# Patient Record
Sex: FEMALE | Race: WHITE | NOT HISPANIC OR LATINO | Employment: OTHER | URBAN - METROPOLITAN AREA
[De-identification: names, ages, dates, MRNs, and addresses within clinical notes are randomized per-mention and may not be internally consistent; named-entity substitution may affect disease eponyms.]

---

## 2017-01-04 ENCOUNTER — OFFICE VISIT - RIVER FALLS (OUTPATIENT)
Dept: FAMILY MEDICINE | Facility: CLINIC | Age: 67
End: 2017-01-04

## 2017-01-04 ASSESSMENT — MIFFLIN-ST. JEOR: SCORE: 1547.59

## 2017-07-07 ENCOUNTER — OFFICE VISIT - RIVER FALLS (OUTPATIENT)
Dept: FAMILY MEDICINE | Facility: CLINIC | Age: 67
End: 2017-07-07

## 2017-07-07 ASSESSMENT — MIFFLIN-ST. JEOR: SCORE: 1541.24

## 2017-07-08 LAB
CHOLEST SERPL-MCNC: 184 MG/DL (ref 125–200)
CHOLEST/HDLC SERPL: 3.5 {RATIO}
CREAT SERPL-MCNC: 1.02 MG/DL (ref 0.5–0.99)
GLUCOSE BLD-MCNC: 101 MG/DL (ref 65–99)
HDLC SERPL-MCNC: 53 MG/DL
LDLC SERPL CALC-MCNC: 90 MG/DL
NONHDLC SERPL-MCNC: 131 MG/DL
TRIGL SERPL-MCNC: 207 MG/DL

## 2017-10-18 ENCOUNTER — AMBULATORY - RIVER FALLS (OUTPATIENT)
Dept: FAMILY MEDICINE | Facility: CLINIC | Age: 67
End: 2017-10-18

## 2018-01-03 ENCOUNTER — OFFICE VISIT - RIVER FALLS (OUTPATIENT)
Dept: FAMILY MEDICINE | Facility: CLINIC | Age: 68
End: 2018-01-03

## 2018-07-11 ENCOUNTER — OFFICE VISIT - RIVER FALLS (OUTPATIENT)
Dept: FAMILY MEDICINE | Facility: CLINIC | Age: 68
End: 2018-07-11

## 2018-07-11 ASSESSMENT — MIFFLIN-ST. JEOR: SCORE: 1553.72

## 2018-07-12 LAB
CHOLEST SERPL-MCNC: 185 MG/DL
CHOLEST/HDLC SERPL: 3.8 {RATIO}
CREAT SERPL-MCNC: 1.09 MG/DL (ref 0.5–0.99)
GLUCOSE BLD-MCNC: 103 MG/DL (ref 65–99)
HDLC SERPL-MCNC: 49 MG/DL
LDLC SERPL CALC-MCNC: 103 MG/DL
NONHDLC SERPL-MCNC: 136 MG/DL
TRIGL SERPL-MCNC: 211 MG/DL

## 2018-08-14 ENCOUNTER — OFFICE VISIT - RIVER FALLS (OUTPATIENT)
Dept: FAMILY MEDICINE | Facility: CLINIC | Age: 68
End: 2018-08-14

## 2018-10-02 ENCOUNTER — AMBULATORY - RIVER FALLS (OUTPATIENT)
Dept: FAMILY MEDICINE | Facility: CLINIC | Age: 68
End: 2018-10-02

## 2019-01-16 ENCOUNTER — OFFICE VISIT - RIVER FALLS (OUTPATIENT)
Dept: FAMILY MEDICINE | Facility: CLINIC | Age: 69
End: 2019-01-16

## 2019-07-12 ENCOUNTER — OFFICE VISIT - RIVER FALLS (OUTPATIENT)
Dept: FAMILY MEDICINE | Facility: CLINIC | Age: 69
End: 2019-07-12

## 2019-07-12 ASSESSMENT — MIFFLIN-ST. JEOR: SCORE: 1511.08

## 2019-07-13 ENCOUNTER — COMMUNICATION - RIVER FALLS (OUTPATIENT)
Dept: FAMILY MEDICINE | Facility: CLINIC | Age: 69
End: 2019-07-13

## 2019-07-13 LAB
BUN SERPL-MCNC: 21 MG/DL (ref 7–25)
BUN/CREAT RATIO - HISTORICAL: 20 (ref 6–22)
CALCIUM SERPL-MCNC: 10 MG/DL (ref 8.6–10.4)
CHLORIDE BLD-SCNC: 101 MMOL/L (ref 98–110)
CHOLEST SERPL-MCNC: 184 MG/DL
CHOLEST/HDLC SERPL: 3.8 {RATIO}
CO2 SERPL-SCNC: 32 MMOL/L (ref 20–32)
CREAT SERPL-MCNC: 1.03 MG/DL (ref 0.5–0.99)
EGFRCR SERPLBLD CKD-EPI 2021: 56 ML/MIN/1.73M2
GLUCOSE BLD-MCNC: 100 MG/DL (ref 65–99)
HDLC SERPL-MCNC: 49 MG/DL
LDLC SERPL CALC-MCNC: 102 MG/DL
NONHDLC SERPL-MCNC: 135 MG/DL
POTASSIUM BLD-SCNC: 4.6 MMOL/L (ref 3.5–5.3)
SODIUM SERPL-SCNC: 140 MMOL/L (ref 135–146)
TRIGL SERPL-MCNC: 212 MG/DL

## 2019-08-05 LAB — COLOGUARD-ABSTRACT: NEGATIVE

## 2019-10-04 ENCOUNTER — OFFICE VISIT - RIVER FALLS (OUTPATIENT)
Dept: FAMILY MEDICINE | Facility: CLINIC | Age: 69
End: 2019-10-04

## 2019-10-04 ASSESSMENT — MIFFLIN-ST. JEOR: SCORE: 1518.34

## 2019-10-11 ENCOUNTER — AMBULATORY - RIVER FALLS (OUTPATIENT)
Dept: FAMILY MEDICINE | Facility: CLINIC | Age: 69
End: 2019-10-11

## 2020-01-15 ENCOUNTER — OFFICE VISIT - RIVER FALLS (OUTPATIENT)
Dept: FAMILY MEDICINE | Facility: CLINIC | Age: 70
End: 2020-01-15

## 2020-01-15 ASSESSMENT — MIFFLIN-ST. JEOR: SCORE: 1519.24

## 2020-07-10 ENCOUNTER — OFFICE VISIT - RIVER FALLS (OUTPATIENT)
Dept: FAMILY MEDICINE | Facility: CLINIC | Age: 70
End: 2020-07-10

## 2020-07-10 ASSESSMENT — MIFFLIN-ST. JEOR: SCORE: 1490.21

## 2020-07-23 ENCOUNTER — OFFICE VISIT - RIVER FALLS (OUTPATIENT)
Dept: FAMILY MEDICINE | Facility: CLINIC | Age: 70
End: 2020-07-23

## 2020-07-23 ASSESSMENT — MIFFLIN-ST. JEOR: SCORE: 1503.37

## 2020-07-24 ENCOUNTER — COMMUNICATION - RIVER FALLS (OUTPATIENT)
Dept: FAMILY MEDICINE | Facility: CLINIC | Age: 70
End: 2020-07-24

## 2020-07-24 LAB
BUN SERPL-MCNC: 34 MG/DL (ref 7–25)
BUN/CREAT RATIO - HISTORICAL: 28 (ref 6–22)
CALCIUM SERPL-MCNC: 9.9 MG/DL (ref 8.6–10.4)
CHLORIDE BLD-SCNC: 98 MMOL/L (ref 98–110)
CHOLEST SERPL-MCNC: 191 MG/DL
CHOLEST/HDLC SERPL: 3.4 {RATIO}
CO2 SERPL-SCNC: 31 MMOL/L (ref 20–32)
CREAT SERPL-MCNC: 1.23 MG/DL (ref 0.5–0.99)
EGFRCR SERPLBLD CKD-EPI 2021: 45 ML/MIN/1.73M2
GLUCOSE BLD-MCNC: 118 MG/DL (ref 65–99)
HDLC SERPL-MCNC: 56 MG/DL
LDLC SERPL CALC-MCNC: 105 MG/DL
NONHDLC SERPL-MCNC: 135 MG/DL
POTASSIUM BLD-SCNC: 4.4 MMOL/L (ref 3.5–5.3)
SODIUM SERPL-SCNC: 137 MMOL/L (ref 135–146)
TRIGL SERPL-MCNC: 187 MG/DL

## 2020-09-02 ENCOUNTER — COMMUNICATION - RIVER FALLS (OUTPATIENT)
Dept: FAMILY MEDICINE | Facility: CLINIC | Age: 70
End: 2020-09-02

## 2020-09-02 ENCOUNTER — OFFICE VISIT - RIVER FALLS (OUTPATIENT)
Dept: FAMILY MEDICINE | Facility: CLINIC | Age: 70
End: 2020-09-02

## 2020-09-16 ENCOUNTER — AMBULATORY - RIVER FALLS (OUTPATIENT)
Dept: FAMILY MEDICINE | Facility: CLINIC | Age: 70
End: 2020-09-16

## 2020-09-17 LAB
BUN SERPL-MCNC: 27 MG/DL (ref 7–25)
BUN/CREAT RATIO - HISTORICAL: 23 (ref 6–22)
CALCIUM SERPL-MCNC: 9.9 MG/DL (ref 8.6–10.4)
CHLORIDE BLD-SCNC: 94 MMOL/L (ref 98–110)
CO2 SERPL-SCNC: 31 MMOL/L (ref 20–32)
CREAT SERPL-MCNC: 1.2 MG/DL (ref 0.5–0.99)
EGFRCR SERPLBLD CKD-EPI 2021: 46 ML/MIN/1.73M2
GLUCOSE BLD-MCNC: 100 MG/DL (ref 65–99)
POTASSIUM BLD-SCNC: 4.6 MMOL/L (ref 3.5–5.3)
SODIUM SERPL-SCNC: 133 MMOL/L (ref 135–146)
URATE SERPL-MCNC: 8.5 MG/DL (ref 2.5–7)

## 2020-09-23 ENCOUNTER — OFFICE VISIT - RIVER FALLS (OUTPATIENT)
Dept: FAMILY MEDICINE | Facility: CLINIC | Age: 70
End: 2020-09-23

## 2020-09-23 ASSESSMENT — MIFFLIN-ST. JEOR: SCORE: 1531.49

## 2020-10-14 ENCOUNTER — OFFICE VISIT - RIVER FALLS (OUTPATIENT)
Dept: FAMILY MEDICINE | Facility: CLINIC | Age: 70
End: 2020-10-14

## 2020-10-14 ASSESSMENT — MIFFLIN-ST. JEOR: SCORE: 1530.13

## 2020-10-15 LAB
ALBUMIN SERPL-MCNC: 4.4 GM/DL (ref 3.6–5.1)
ALBUMIN UR-MCNC: NEGATIVE G/DL
APPEARANCE UR: CLEAR
BACTERIA #/AREA URNS HPF: ABNORMAL /HPF
BILIRUB UR QL STRIP: NEGATIVE
CAOX CRY #/AREA URNS HPF: ABNORMAL /HPF
COLOR UR AUTO: YELLOW
CREAT UR-MCNC: 155 MG/DL (ref 20–275)
GLUCOSE UR STRIP-MCNC: NEGATIVE MG/DL
HGB BLD-MCNC: 14.8 GM/DL (ref 11.7–15.5)
HGB UR QL STRIP: NEGATIVE
HYALINE CASTS #/AREA URNS LPF: ABNORMAL /LPF
KETONES UR STRIP-MCNC: NEGATIVE MG/DL
LEUKOCYTE ESTERASE UR QL STRIP: NEGATIVE
MICROALBUMIN UR-MCNC: 1.4 MG/DL
MICROALBUMIN/CREAT UR: 9 MG/G{CREAT}
NITRATE UR QL: NEGATIVE
PH UR STRIP: 6.5 [PH] (ref 5–8)
PTH-INTACT SERPL-MCNC: 60 PG/ML (ref 14–64)
RBC #/AREA URNS AUTO: ABNORMAL /HPF
SP GR UR STRIP: 1.02 (ref 1–1.03)
SQUAMOUS #/AREA URNS AUTO: ABNORMAL /HPF
WBC #/AREA URNS AUTO: ABNORMAL /HPF

## 2020-10-19 ENCOUNTER — COMMUNICATION - RIVER FALLS (OUTPATIENT)
Dept: FAMILY MEDICINE | Facility: CLINIC | Age: 70
End: 2020-10-19

## 2020-11-04 ENCOUNTER — OFFICE VISIT - RIVER FALLS (OUTPATIENT)
Dept: FAMILY MEDICINE | Facility: CLINIC | Age: 70
End: 2020-11-04

## 2020-11-04 ASSESSMENT — MIFFLIN-ST. JEOR: SCORE: 1530.13

## 2021-01-13 ENCOUNTER — OFFICE VISIT - RIVER FALLS (OUTPATIENT)
Dept: FAMILY MEDICINE | Facility: CLINIC | Age: 71
End: 2021-01-13

## 2021-01-13 ASSESSMENT — MIFFLIN-ST. JEOR: SCORE: 1529.22

## 2021-01-14 ENCOUNTER — COMMUNICATION - RIVER FALLS (OUTPATIENT)
Dept: FAMILY MEDICINE | Facility: CLINIC | Age: 71
End: 2021-01-14

## 2021-01-14 LAB
BUN SERPL-MCNC: 22 MG/DL (ref 7–25)
BUN/CREAT RATIO - HISTORICAL: ABNORMAL (ref 6–22)
CALCIUM SERPL-MCNC: 9.9 MG/DL (ref 8.6–10.4)
CHLORIDE BLD-SCNC: 102 MMOL/L (ref 98–110)
CO2 SERPL-SCNC: 29 MMOL/L (ref 20–32)
CREAT SERPL-MCNC: 0.86 MG/DL (ref 0.5–0.99)
EGFRCR SERPLBLD CKD-EPI 2021: 69 ML/MIN/1.73M2
GLUCOSE BLD-MCNC: 104 MG/DL (ref 65–99)
POTASSIUM BLD-SCNC: 4.3 MMOL/L (ref 3.5–5.3)
SODIUM SERPL-SCNC: 139 MMOL/L (ref 135–146)

## 2021-07-28 ENCOUNTER — TRANSFERRED RECORDS (OUTPATIENT)
Dept: MULTI SPECIALTY CLINIC | Facility: CLINIC | Age: 71
End: 2021-07-28

## 2021-07-28 ENCOUNTER — OFFICE VISIT - RIVER FALLS (OUTPATIENT)
Dept: FAMILY MEDICINE | Facility: CLINIC | Age: 71
End: 2021-07-28

## 2021-07-28 ENCOUNTER — COMMUNICATION - RIVER FALLS (OUTPATIENT)
Dept: FAMILY MEDICINE | Facility: CLINIC | Age: 71
End: 2021-07-28

## 2021-07-28 ASSESSMENT — MIFFLIN-ST. JEOR: SCORE: 1534.67

## 2021-07-29 ENCOUNTER — COMMUNICATION - RIVER FALLS (OUTPATIENT)
Dept: FAMILY MEDICINE | Facility: CLINIC | Age: 71
End: 2021-07-29

## 2021-07-29 LAB
CHOLEST SERPL-MCNC: 173 MG/DL
CHOLEST/HDLC SERPL: 3.5 {RATIO}
HDLC SERPL-MCNC: 50 MG/DL
LDLC SERPL CALC-MCNC: 92 MG/DL
NONHDLC SERPL-MCNC: 123 MG/DL
TRIGL SERPL-MCNC: 212 MG/DL

## 2021-09-21 ENCOUNTER — AMBULATORY - RIVER FALLS (OUTPATIENT)
Dept: FAMILY MEDICINE | Facility: CLINIC | Age: 71
End: 2021-09-21

## 2021-12-02 ENCOUNTER — AMBULATORY - RIVER FALLS (OUTPATIENT)
Dept: FAMILY MEDICINE | Facility: CLINIC | Age: 71
End: 2021-12-02

## 2022-01-14 ENCOUNTER — OFFICE VISIT - RIVER FALLS (OUTPATIENT)
Dept: FAMILY MEDICINE | Facility: CLINIC | Age: 72
End: 2022-01-14

## 2022-02-11 VITALS
DIASTOLIC BLOOD PRESSURE: 82 MMHG | HEIGHT: 64 IN | WEIGHT: 223.6 LBS | SYSTOLIC BLOOD PRESSURE: 134 MMHG | HEART RATE: 93 BPM | BODY MASS INDEX: 38.17 KG/M2 | TEMPERATURE: 99.2 F

## 2022-02-11 VITALS
DIASTOLIC BLOOD PRESSURE: 82 MMHG | SYSTOLIC BLOOD PRESSURE: 119 MMHG | SYSTOLIC BLOOD PRESSURE: 170 MMHG | TEMPERATURE: 98.7 F | HEIGHT: 64 IN | DIASTOLIC BLOOD PRESSURE: 76 MMHG | SYSTOLIC BLOOD PRESSURE: 144 MMHG | HEIGHT: 64 IN | HEIGHT: 64 IN | HEART RATE: 74 BPM | TEMPERATURE: 97.9 F | BODY MASS INDEX: 38.58 KG/M2 | HEART RATE: 72 BPM | TEMPERATURE: 98.3 F | OXYGEN SATURATION: 97 % | WEIGHT: 226 LBS | HEART RATE: 84 BPM | WEIGHT: 226.3 LBS | DIASTOLIC BLOOD PRESSURE: 80 MMHG | DIASTOLIC BLOOD PRESSURE: 86 MMHG | SYSTOLIC BLOOD PRESSURE: 150 MMHG | HEART RATE: 88 BPM | BODY MASS INDEX: 38.64 KG/M2 | TEMPERATURE: 98.7 F | BODY MASS INDEX: 37.78 KG/M2 | WEIGHT: 226 LBS | BODY MASS INDEX: 38.58 KG/M2 | HEIGHT: 64 IN

## 2022-02-11 VITALS
WEIGHT: 220.1 LBS | BODY MASS INDEX: 37.08 KG/M2 | WEIGHT: 217.2 LBS | TEMPERATURE: 97.8 F | SYSTOLIC BLOOD PRESSURE: 135 MMHG | BODY MASS INDEX: 37.58 KG/M2 | SYSTOLIC BLOOD PRESSURE: 159 MMHG | HEIGHT: 64 IN | HEART RATE: 84 BPM | TEMPERATURE: 100 F | HEART RATE: 121 BPM | DIASTOLIC BLOOD PRESSURE: 89 MMHG | HEIGHT: 64 IN | DIASTOLIC BLOOD PRESSURE: 81 MMHG

## 2022-02-11 VITALS
HEART RATE: 91 BPM | SYSTOLIC BLOOD PRESSURE: 122 MMHG | TEMPERATURE: 98.4 F | DIASTOLIC BLOOD PRESSURE: 86 MMHG | WEIGHT: 232.4 LBS

## 2022-02-11 VITALS
OXYGEN SATURATION: 97 % | TEMPERATURE: 98.7 F | BODY MASS INDEX: 38.14 KG/M2 | WEIGHT: 225.8 LBS | HEART RATE: 102 BPM | TEMPERATURE: 98.7 F | SYSTOLIC BLOOD PRESSURE: 145 MMHG | DIASTOLIC BLOOD PRESSURE: 97 MMHG | WEIGHT: 223.4 LBS | DIASTOLIC BLOOD PRESSURE: 71 MMHG | HEIGHT: 64 IN | HEART RATE: 80 BPM | HEIGHT: 64 IN | BODY MASS INDEX: 38.55 KG/M2 | SYSTOLIC BLOOD PRESSURE: 117 MMHG

## 2022-02-11 VITALS
BODY MASS INDEX: 39.54 KG/M2 | HEART RATE: 97 BPM | HEIGHT: 64 IN | SYSTOLIC BLOOD PRESSURE: 132 MMHG | DIASTOLIC BLOOD PRESSURE: 84 MMHG | WEIGHT: 231.6 LBS | TEMPERATURE: 98.2 F

## 2022-02-11 VITALS
SYSTOLIC BLOOD PRESSURE: 132 MMHG | HEIGHT: 64 IN | DIASTOLIC BLOOD PRESSURE: 84 MMHG | WEIGHT: 227 LBS | HEART RATE: 60 BPM | BODY MASS INDEX: 38.76 KG/M2

## 2022-02-11 VITALS
TEMPERATURE: 97.6 F | BODY MASS INDEX: 37.87 KG/M2 | WEIGHT: 221.8 LBS | DIASTOLIC BLOOD PRESSURE: 82 MMHG | SYSTOLIC BLOOD PRESSURE: 133 MMHG | HEIGHT: 64 IN | HEART RATE: 66 BPM

## 2022-02-11 VITALS
HEART RATE: 82 BPM | WEIGHT: 224.8 LBS | BODY MASS INDEX: 38.59 KG/M2 | TEMPERATURE: 97.7 F | SYSTOLIC BLOOD PRESSURE: 125 MMHG | DIASTOLIC BLOOD PRESSURE: 81 MMHG

## 2022-02-11 VITALS
DIASTOLIC BLOOD PRESSURE: 83 MMHG | HEART RATE: 73 BPM | SYSTOLIC BLOOD PRESSURE: 125 MMHG | WEIGHT: 231.2 LBS | TEMPERATURE: 98.2 F | BODY MASS INDEX: 39.47 KG/M2 | HEIGHT: 64 IN

## 2022-02-11 VITALS
SYSTOLIC BLOOD PRESSURE: 117 MMHG | WEIGHT: 230.2 LBS | HEIGHT: 64 IN | BODY MASS INDEX: 39.3 KG/M2 | HEART RATE: 76 BPM | DIASTOLIC BLOOD PRESSURE: 80 MMHG | TEMPERATURE: 98.7 F

## 2022-02-15 NOTE — PROGRESS NOTES
Patient:   JULIEN WU            MRN: [id number]            FIN: [id number]               Age:   70 years     Sex:  Female     :  1950   Associated Diagnoses:   Chronic gout; Hypertensive renal failure   Author:   Jorge Tim MD      Visit Information      Date of Service: 2020 08:32 am  Performing Location: [facility]  Encounter#: [id number]      Primary Care Provider (PCP):  Jorge Tim MD    NPI# [id number]      Referring Provider:  Jorge Tim MD# [id number]      Chief Complaint   2020 8:46 AM CDT    f/u labs        History of Present Illness   Patient is here for follow-up on her hypertension and her renal failure.  She notes her blood pressure is running a bit high off the hydrochlorothiazide and she is not urinating as much.  She is had no flares of gout.  No issues with increased edema.         Review of Systems   Constitutional:  Negative.    Eye:  Negative.    Ear/Nose/Mouth/Throat:  Negative.    Respiratory:  Negative.    Cardiovascular:  Negative.    Gastrointestinal:  Negative.    Genitourinary:  Negative.    Hematology/Lymphatics:  Negative.    Endocrine:  Negative.    Immunologic:  Negative.    Musculoskeletal:  Negative.    Integumentary:  Negative.    Neurologic:  Negative.       Health Status   Allergies:    Allergic Reactions (Selected)  Severity Not Documented  Amoxicillin (Rash and hand swelling)   Medications:  (Selected)   Prescriptions  Prescribed  amLODIPine 5 mg oral tablet: = 1 tab(s) ( 5 mg ), Oral, daily, # 90 tab(s), 0 Refill(s), Type: Maintenance, Pharmacy: [email or web address], 1 tab(s) Oral daily, 64, in, 20 9:01:00 CDT, Height Measured, 220.1, lb, 20 8:39:00 CDT, Weight Measured  atorvastatin 20 mg oral tablet: = 1 tab(s) ( 20 mg ), po, daily, # 90 tab(s), 1 Refill(s), Type: Maintenance, Pharmacy: [facility], 1 tab(s) Oral daily, 64, in, 20 8:39:00 CDT, Height Measured, Weight  Measured  lisinopril 10 mg oral tablet: = 1 tab(s) ( 10 mg ), po, daily, # 90 tab(s), 1 Refill(s), Type: Maintenance, Pharmacy: [facility], 1 tab(s) Oral daily, 64, in, 07/23/20 8:39:00 CDT, Height Measured, Weight Measured  metoprolol succinate 25 mg oral tablet, extended release: = 1 tab(s) ( 25 mg ), Oral, daily, # 90 tab(s), 1 Refill(s), Type: Maintenance, Pharmacy: [facility], 1 tab(s) Oral daily, 64, in, 09/23/20 8:46:00 CDT, Height Measured, 226.3, lb, 09/23/20 8:46:00 CDT, Weight Measured  Suspended  hydroCHLOROthiazide 25 mg oral tablet: = 1 tab(s) ( 25 mg ), po, daily, # 90 tab(s), 1 Refill(s), Type: Maintenance, Pharmacy: [facility], 1 tab(s) Oral daily, 64, in, 07/23/20 8:39:00 CDT, Height Measured, Weight Measured  Documented Medications  Documented  Fish Oil: po, 0 Refill(s), Type: Maintenance  Multiple Vitamins oral tablet: 1 tab(s), po, daily, 0 Refill(s), Type: Maintenance  Vitamin C: daily, 0 Refill(s), Type: Maintenance  garlic oral tablet: 0 Refill(s), Type: Maintenance,    Medications          *denotes recorded medication          amLODIPine 5 mg oral tablet: 5 mg, 1 tab(s), Oral, daily, 90 tab(s), 0 Refill(s).          *Vitamin C: daily.          atorvastatin 20 mg oral tablet: 20 mg, 1 tab(s), po, daily, 90 tab(s), 1 Refill(s).          *garlic oral tablet: 0 Refill(s), Type: Maintenance.          lisinopril 10 mg oral tablet: 10 mg, 1 tab(s), po, daily, 90 tab(s), 1 Refill(s).          metoprolol succinate 25 mg oral tablet, extended release: 25 mg, 1 tab(s), Oral, daily, 90 tab(s), 1 Refill(s).          *Multiple Vitamins oral tablet: 1 tab(s), po, daily.          *Fish Oil: po.       Problem list:    All Problems  Hypertension / SNOMED CT 51072947 / Confirmed  Chronic gout / SNOMED CT 693698950 / Confirmed  Elevated cholesterol / SNOMED CT 20715092 / Confirmed  Obesity / SNOMED CT 5857273253 / Probable  Degenerative arthritis of knee / SNOMED CT 096068097 /  Confirmed  Seasonal allergies / SNOMED CT 556193735 / Confirmed      Histories   Past Medical History:    Active  Obesity (7828912967)  Degenerative arthritis of knee (363076914)  Comments:  10/13/2014 CDT 9:12 AM CDT - Shivani Ayala  Bilateral  Elevated cholesterol (50566454)  Hypertension (43867474)  Seasonal allergies (643700160)  Chronic gout (190829102)   Family History:    Liver cancer  Mother  Hypertension  Mother  Heart disease  Father  Parkinson's disease  Mother  Alzheimer's disease  Mother     Procedure history:    Screening for colon cancer (SNOMED CT 2513220005) performed by Jorge Tim MD on 7/25/2019 at 69 Years.  Comments:  8/5/2019 3:29 PM CDT - Estefani Jay MA resutl:  negative  Recommendation:  f/u 3yrs  Mammogram (SNOMED CT 965367758) on 8/3/2016 at 66 Years.  Comments:  8/4/2016 7:51 PM CDT - Leni Cantu CMA  ACR 1 Negative; recommend annual mammograms  Screening for malignant neoplasm of colon (SNOMED CT 9017499783) performed by Jorge Tim MD on 7/20/2016 at 66 Years.  Comments:  8/1/2016 6:54 PM MARKT Estefani Fernandez MA result--negative  F/U:   3yrs   Social History:        Alcohol Assessment: Current            Wine (5 oz), 1-2 times per year, 1 drinks/episode average.  2 drinks/episode maximum.  Ready to change: No.                     Comments:                      07/07/2017 - Shivani Ayala                     Occasionally      Tobacco Assessment: Denies Tobacco Use            Never (less than 100 in lifetime)      Substance Abuse Assessment: Denies Substance Abuse            Never      Employment and Education Assessment            Retired      Home and Environment Assessment            Marital status: .  Spouse/Partner name: Yao Au.  Living situation: Home/Independent.               Injuries/Abuse/Neglect in household: No.  Feels unsafe at home: No.  Family/Friends available for support:               Yes.  Risks in environment:  Stairs.      Nutrition and Health Assessment            Type of diet: Regular.  Wants to lose weight: Yes.  Sleeping concerns: No.  Feels highly stressed: No.      Exercise and Physical Activity Assessment: Does not exercise            Exercise frequency: Never.      Sexual Assessment            Sexually active: Yes.  Identifies as female, Sexual orientation: Straight or heterosexual.  History of STD:               No.  History of sexual abuse: No.        Physical Examination   Vital Signs   9/23/2020 8:46 AM CDT Temperature Tympanic 98.7 DegF    Peripheral Pulse Rate 88 bpm    HR Method Electronic    Systolic Blood Pressure 170 mmHg  HI    Diastolic Blood Pressure 86 mmHg  HI    Mean Arterial Pressure 114 mmHg    BP Site Right arm    BP Method Electronic      Measurements from flowsheet : Measurements   9/23/2020 8:46 AM CDT Height Measured - Standard 64 in    Weight Measured - Standard 226.3 lb    BSA 2.15 m2    Body Mass Index 38.84 kg/m2  HI      General:  Alert and oriented, No acute distress.    Respiratory:  Respirations are non-labored.    Cardiovascular:  Normal rate, Regular rhythm.    Neurologic:  Alert, Oriented.       Health Maintenance      Recommendations     Pending (in the next year)        OverDue           Type 2 Diabetes Mellitus Screen (Female) due  11/05/15  and every 1  year(s)           Osteoporosis Screen due  08/14/20  and every 2  year(s)        Due            Influenza Vaccine due  08/31/20  and every 1  year(s)           Hepatitis C Screen [id number] (Female) due  09/23/20  One-time only           Zoster/Shingles Vaccine due  09/23/20  One-time only        Due In Future            Alcohol Misuse Screen (Female) not due until  07/23/21  and every 1  year(s)           Lipid Disorders Screen (Female) not due until  07/23/21  and every 1  year(s)           Depression Screen (Female) not due until  07/23/21  and every 1  year(s)           Fall Risk Screen (Female) not due until  07/23/21  and  every 1  year(s)           Breast Cancer Screen not due until  08/15/21  and every 2  year(s)     Satisfied (in the past 1 year)        Satisfied            Alcohol Misuse Screen (Female) on  07/23/20.           Body Mass Index Check (Female) on  09/23/20.           Body Mass Index Check (Female) on  07/23/20.           Body Mass Index Check (Female) on  07/10/20.           Body Mass Index Check (Female) on  01/15/20.           Body Mass Index Check (Female) on  10/04/19.           Depression Screen (Female) on  07/23/20.           Fall Risk Screen (Female) on  07/23/20.           High Blood Pressure Screen (Female) on  09/23/20.           High Blood Pressure Screen (Female) on  09/16/20.           High Blood Pressure Screen (Female) on  07/23/20.           High Blood Pressure Screen (Female) on  07/10/20.           High Blood Pressure Screen (Female) on  01/15/20.           High Blood Pressure Screen (Female) on  10/04/19.           Influenza Vaccine on  10/11/19.           Lipid Disorders Screen (Female) on  07/23/20.           Lipid Disorders Screen (Female) on  07/23/20.           Lipid Disorders Screen (Female) on  07/23/20.           Lipid Disorders Screen (Female) on  07/23/20.           Obesity Screen and Counseling (Female) on  09/23/20.           Obesity Screen and Counseling (Female) on  07/23/20.           Obesity Screen and Counseling (Female) on  07/10/20.           Obesity Screen and Counseling (Female) on  01/15/20.           Obesity Screen and Counseling (Female) on  10/04/19.           Tobacco Use Screen (Female) on  09/23/20.           Tobacco Use Screen (Female) on  09/02/20.           Tobacco Use Screen (Female) on  07/23/20.           Tobacco Use Screen (Female) on  07/10/20.           Tobacco Use Screen (Female) on  01/15/20.           Tobacco Use Screen (Female) on  10/04/19.        Canceled            Aspirin Therapy for Prevention of CVD (Female) on  07/23/20.           Lung Cancer Screen  (Female) on  07/23/20.          Impression and Plan   Diagnosis     Chronic gout ([id number] [id number]).     Hypertensive renal failure (LKN61-PO I12.9).     Plan:  We will add metoprolol for her blood pressure.  Follow-up in 3 months.  Nephrology referral for her decreasing renal function and recommendations on her hypertension and treatment.  .    Patient Instructions:       Counseled: Patient, Regarding diagnosis, Regarding treatment, Regarding medications.

## 2022-02-15 NOTE — NURSING NOTE
Comprehensive Intake Entered On:  11/4/2020 9:28 AM CST    Performed On:  11/4/2020 9:26 AM CST by Shivani Garcia CMA               Summary   Chief Complaint :   Renal f/u - review results    Advance Directive :   Yes   Weight Measured :   226 lb(Converted to: 226 lb 0 oz, 102.512 kg)    Height Measured :   64 in(Converted to: 5 ft 4 in, 162.56 cm)    Body Mass Index :   38.79 kg/m2 (HI)    Body Surface Area :   2.15 m2   Systolic Blood Pressure :   144 mmHg (HI)    Diastolic Blood Pressure :   76 mmHg   Mean Arterial Pressure :   99 mmHg   Peripheral Pulse Rate :   74 bpm   Temperature Tympanic :   98.7 DegF(Converted to: 37.1 DegC)    Oxygen Saturation :   97 %   Shivani Garcia CMA - 11/4/2020 9:26 AM CST   Health Status   Allergies Verified? :   Yes   Medication History Verified? :   Yes   Medical History Verified? :   Yes   Pre-Visit Planning Status :   Completed   Tobacco Use? :   Never smoker   Shivani Garcia CMA 11/4/2020 9:26 AM CST   ID Risk Screen   Recent Travel History :   No recent travel   Family Member Travel History :   No recent travel   Other Exposure to Infectious Disease :   Unknown   Shivani Garcia CMA - 11/4/2020 9:26 AM CST

## 2022-02-15 NOTE — TELEPHONE ENCOUNTER
Entered by Luz Maria Carey on June 19, 2020 11:25:51 AM CDT  Modified appointment to add labs to already scheduled appointment.      ---------------------  From: Stephanie Regan (TFS Message Pool ([address] [place] [address]))   To: Appointment Pool ([address] [place] [address]);     Sent: 6/19/2020 11:19:47 AM CDT  Subject: RE: General Message     Please schedule face to face visit with TFS.  (labs to be done same day, no need for separate lab visit)         ---------------------  From: Luz Maria Carey (Appointment Pool ([address] [place] [address]))   To: TFS Message Pool ([address] [place] [address]);     Sent: 6/19/2020 11:16:47 AM CDT  Subject: RE: General Message     I just wanted to clarify scheduling labs 1 week prior to appointment as we received an email stating [facility] would like to schedule labs at the same time as the appointment.  Please let me know so I am able to schedule the appointment correctly.  Thank you.      ---------------------  From: Henry Daugherty   To: Appointment Pool ([address] [place] [address]);     Cc: Khushi Larios;      Sent: 6/19/2020 10:50:05 AM CDT  Subject: General Message     Pt was transferred to  to schedule labs. Please call pt back and schedule lab a week prior to her appointment on 07/23/2020. Thanks

## 2022-02-15 NOTE — LETTER
(Inserted Image. Unable to display)            July 29, 2021        JULIEN WU      [address] [place] [address]        Dear JULIEN VITALE,    Thank you for selecting [facility]  for your healthcare needs.  Below you will find the results of the recent tests done at our clinic.     All labs acceptable.      Result Name Current Result Previous Result Reference Range   Cholesterol (mg/dL)  173 7/28/2021  191 7/23/2020  - <200   HDL (mg/dL)  50 7/28/2021  56 7/23/2020 > OR = 50 -    LDL  92 7/28/2021 ((H)) 105 7/23/2020    Triglyceride (mg/dL) ((H)) 212 7/28/2021 ((H)) 187 7/23/2020  - <150       Please contact me or my assistant at [phone] if you have any questions.     Sincerely,        Jorge Tim MD        What do your labs mean?  Below is a glossary of commonly ordered labs:  LDL   Bad Cholesterol   HDL   Good Cholesterol  AST/ALT   Liver Function   Cr/Creatinine   Kidney Function  Microalbumin   Kidney Function  BUN   Kidney Function  PSA   Prostate    TSH   Thyroid Hormone  HgbA1c   Diabetes Test   Hgb (Hemoglobin)   Red Blood Cells

## 2022-02-15 NOTE — TELEPHONE ENCOUNTER
"---------------------  From: Stephanie Regan (TFS Message Pool ([id number]))   To: Phone Messages Pool ([id number]_[place] [facility] [place]);     Sent: 9/2/2020 8:50:29 AM CDT  Subject: Gout Flare     Phone Message    PCP:   CANDACE      Time of Call:  8:23am       Person Calling:  Walter  Phone number:  [phone] (msg okay)    Note:   Patient states she believes she is experiencing gout again. Wonders if she can speak with TFS.     I returned her call at 8:46am.  I advised her that TFS is oc this week.  She was seen for this in July and treated with Prednisone.  She completed the Prednisone without trouble and the gout did resolve.  For the last week she has had the same pain in the same joing (right, great toe).  I advised patient to schedule a telemed visit today.  She agrees and was transferred to scheduling. We were disconnected and when attempting to call her back, went right to voicemail.     Last office visit and reason:  7/23/20 for AWV with TFS.   7/10/20 for gout with TFSConnected with patient.  Virtually \"roomed\" her at 9:02am  "

## 2022-02-15 NOTE — NURSING NOTE
Comprehensive Intake Entered On:  1/15/2020 9:23 AM CST    Performed On:  1/15/2020 9:22 AM CST by Stephanie Regan               Summary   Chief Complaint :   HTN check up   Advance Directive :   Yes   Weight Measured :   223.6 lb(Converted to: 223 lb 10 oz, 101.42 kg)    Height Measured :   64 in(Converted to: 5 ft 4 in, 162.56 cm)    Body Mass Index :   38.38 kg/m2 (HI)    Body Surface Area :   2.14 m2   Systolic Blood Pressure :   134 mmHg (HI)    Diastolic Blood Pressure :   82 mmHg (HI)    Mean Arterial Pressure :   99 mmHg   Peripheral Pulse Rate :   93 bpm   BP Method :   Electronic   HR Method :   Electronic   Temperature Tympanic :   99.2 DegF(Converted to: 37.3 DegC)    Stephanie Regan - 1/15/2020 9:22 AM CST   Health Status   Allergies Verified? :   Yes   Medication History Verified? :   Yes   Pre-Visit Planning Status :   Completed   Tobacco Use? :   Never smoker   Stephanie Regan - 1/15/2020 9:22 AM CST   Meds / Allergies   (As Of: 1/15/2020 9:23:43 AM CST)   Allergies (Active)   amoxicillin  Estimated Onset Date:   Unspecified ; Reactions:   rash, hand swelling ; Created By:   Marry Montalvo RN; Reaction Status:   Active ; Category:   Drug ; Substance:   amoxicillin ; Type:   Allergy ; Updated By:   Marry Montalvo RN; Reviewed Date:   7/11/2018 9:10 AM CDT        Medication List   (As Of: 1/15/2020 9:23:43 AM CST)   Prescription/Discharge Order    hydroCHLOROthiazide  :   hydroCHLOROthiazide ; Status:   Prescribed ; Ordered As Mnemonic:   hydroCHLOROthiazide 25 mg oral tablet ; Simple Display Line:   25 mg, 1 tab(s), po, daily, 90 tab(s), 1 Refill(s) ; Ordering Provider:   Jorge Tim MD; Catalog Code:   hydroCHLOROthiazide ; Order Dt/Tm:   7/12/2019 10:10:46 AM CDT          lisinopril  :   lisinopril ; Status:   Prescribed ; Ordered As Mnemonic:   lisinopril 10 mg oral tablet ; Simple Display Line:   10 mg, 1 tab(s), po, daily, 90 tab(s), 1 Refill(s) ; Ordering Provider:    Jorge Tim MD; Catalog Code:   lisinopril ; Order Dt/Tm:   7/12/2019 10:10:46 AM CDT          atorvastatin  :   atorvastatin ; Status:   Prescribed ; Ordered As Mnemonic:   atorvastatin 20 mg oral tablet ; Simple Display Line:   20 mg, 1 tab(s), po, daily, 90 tab(s), 1 Refill(s) ; Ordering Provider:   Jorge Tim MD; Catalog Code:   atorvastatin ; Order Dt/Tm:   7/12/2019 10:10:45 AM CDT            Home Meds    garlic  :   garlic ; Status:   Documented ; Ordered As Mnemonic:   garlic oral tablet ; Catalog Code:   garlic ; Order Dt/Tm:   10/27/2014 8:57:13 AM CDT          omega-3 polyunsaturated fatty acids  :   omega-3 polyunsaturated fatty acids ; Status:   Documented ; Ordered As Mnemonic:   Fish Oil ; Simple Display Line:   po ; Catalog Code:   omega-3 polyunsaturated fatty acids ; Order Dt/Tm:   10/27/2014 8:57:25 AM CDT          ascorbic acid  :   ascorbic acid ; Status:   Documented ; Ordered As Mnemonic:   Vitamin C ; Simple Display Line:   daily ; Catalog Code:   ascorbic acid ; Order Dt/Tm:   10/10/2014 8:57:56 AM CDT          multivitamin  :   multivitamin ; Status:   Documented ; Ordered As Mnemonic:   Multiple Vitamins oral tablet ; Simple Display Line:   1 tab(s), po, daily ; Catalog Code:   multivitamin ; Order Dt/Tm:   10/10/2014 8:57:49 AM CDT

## 2022-02-15 NOTE — PROGRESS NOTES
Patient:   JULIEN WU            MRN: [id number]            FIN: [id number]               Age:   70 years     Sex:  Female     :  1950   Associated Diagnoses:   Acute gout   Author:   Jorge Tim MD      Visit Information      Date of Service: 07/10/2020 02:26 pm  Performing Location: [facility]  Encounter#: [id number]      Chief Complaint   7/10/2020 2:27 PM CDT    c/o big right toe pain present since Wednesday .        History of Present Illness   chief complaint and symptoms as noted above confirmed with patient   no injury  no fever  no hx gout      Review of Systems   Constitutional:  Negative except as documented in history of present illness.    Integumentary:  Negative.    Neurologic:  Negative.       Health Status   Allergies:    Allergic Reactions (Selected)  Severity Not Documented  Amoxicillin (Rash and hand swelling)   Medications:  (Selected)   Prescriptions  Prescribed  atorvastatin 20 mg oral tablet: = 1 tab(s) ( 20 mg ), po, daily, # 30 tab(s), 0 Refill(s), Type: Maintenance, Pharmacy: [facility], 1 tab(s) Oral daily, 64, in, 01/15/20 9:22:00 CST, Height Measured, 223.6, lb, 01/15/20 9:22:00 CST, Weight Measured  hydroCHLOROthiazide 25 mg oral tablet: = 1 tab(s) ( 25 mg ), po, daily, # 30 tab(s), 0 Refill(s), Type: Maintenance, Pharmacy: [facility], 1 tab(s) Oral daily, 64, in, 01/15/20 9:22:00 CST, Height Measured, 223.6, lb, 01/15/20 9:22:00 CST, Weight Measured  lisinopril 10 mg oral tablet: = 1 tab(s) ( 10 mg ), po, daily, # 30 tab(s), 0 Refill(s), Type: Maintenance, Pharmacy: [facility], 1 tab(s) Oral daily, 64, in, 01/15/20 9:22:00 CST, Height Measured, 223.6, lb, 01/15/20 9:22:00 CST, Weight Measured  predniSONE 10 mg oral tablet: 4 tabs daily for 3 days taper bey 1 tab every 3 days, Oral, daily, # 30 tab(s), 0 Refill(s), Type: Maintenance, Pharmacy: [facility], 4 tabs daily for 3 days taper bey 1 tab every 3 days  Oral daily, 64, in, 07/10/20 14:27:00 CDT, Height Whitney...  Documented Medications  Documented  Fish Oil: po, 0 Refill(s), Type: Maintenance  Multiple Vitamins oral tablet: 1 tab(s), po, daily, 0 Refill(s), Type: Maintenance  Vitamin C: daily, 0 Refill(s), Type: Maintenance  garlic oral tablet: 0 Refill(s), Type: Maintenance   Problem list:    All Problems  Hypertension / SNOMED CT 20863017 / Confirmed  Elevated cholesterol / SNOMED CT 54095319 / Confirmed  Obesity / SNOMED CT 3180978143 / Probable  Degenerative arthritis of knee / SNOMED CT 756289069 / Confirmed  Seasonal allergies / SNOMED CT 179336944 / Confirmed      Histories   Past Medical History:    Active  Degenerative arthritis of knee (335969560)  Comments:  10/13/2014 CDT 9:12 AM CDT - Shivani Ayala  Bilateral  Elevated cholesterol (69727211)  Seasonal allergies (718571816)   Family History:    Liver cancer  Mother  Hypertension  Mother  Heart disease  Father  Parkinson's disease  Mother  Alzheimer's disease  Mother     Procedure history:    Screening for colon cancer (SNOMED CT 2980671694) performed by Jorge Tim MD on 7/25/2019 at 69 Years.  Comments:  8/5/2019 3:29 PM MARKT - Estefani Jay MA resutl:  negative  Recommendation:  f/u 3yrs  Mammogram (SNOMED CT 364225683) on 8/3/2016 at 66 Years.  Comments:  8/4/2016 7:51 PM CDT - Leni Cantu CMA  ACR 1 Negative; recommend annual mammograms  Screening for malignant neoplasm of colon (SNOMED CT 3974108287) performed by Jorge Tim MD on 7/20/2016 at 66 Years.  Comments:  8/1/2016 6:54 PM MARKT Estefani Fernandez MA result--negative  F/U:   3yrs   Social History:        Alcohol Assessment: Current            Current, Wine (5 oz), 1-2 times per year, Ready to change: No.                     Comments:                      07/07/2017 - Shivani Ayala                     Occasionally      Tobacco Assessment: Denies Tobacco Use            Never (less than 100 in lifetime)       Substance Abuse Assessment: Denies Substance Abuse            Never      Employment and Education Assessment            Retired      Home and Environment Assessment            Marital status: .  Spouse/Partner name: Yao Trujillo.  Injuries/Abuse/Neglect in household: No.  Feels               unsafe at home: No.  Family/Friends available for support: Yes.  Risks in environment: Stairs.      Nutrition and Health Assessment            Type of diet: Regular.  Wants to lose weight: Yes.  Sleeping concerns: No.  Feels highly stressed: No.      Exercise and Physical Activity Assessment: Does not exercise            Exercise frequency: Never.      Sexual Assessment            Identifies as female, Sexual orientation: Straight or heterosexual.  History of STD: No.  History of sexual               abuse: No.        Physical Examination   Vital Signs   7/10/2020 2:27 PM CDT Temperature Tympanic 100.0 DegF    Peripheral Pulse Rate 121 bpm  HI    HR Method Electronic    Systolic Blood Pressure 159 mmHg  HI    Diastolic Blood Pressure 89 mmHg  HI    Mean Arterial Pressure 112 mmHg    BP Site Right arm    BP Method Electronic      Measurements from flowsheet : Measurements   7/10/2020 2:27 PM CDT Height Measured - Standard 64 in    Weight Measured - Standard 217.2 lb    BSA 2.11 m2    Body Mass Index 37.28 kg/m2  HI      General:  Alert and oriented, No acute distress.    Musculoskeletal:       Lower extremity exam: Toe ( Right, Hallux, MTP tender and swollen warm ).    Neurologic:  Alert, Oriented.       Impression and Plan   Diagnosis     Acute gout (LDC08-BO M10.9).     Course:  Progressing as expected.    Plan:  pred taper  fu 1 week if not better sooner if worse  consider dxc hctz.    Patient Instructions:       Counseled: Patient, Regarding diagnosis, Regarding medications, Activity.

## 2022-02-15 NOTE — PROGRESS NOTES
Patient:   JULIEN WU            MRN: [id number]            FIN: [id number]               Age:   68 years     Sex:  Female     :  1950   Associated Diagnoses:   Hypertension; Elevated cholesterol   Author:   Jorge Tim MD      Visit Information      Date of Service: 2019 08:57 am  Performing Location: [facility]  Encounter#: [id number]      Primary Care Provider (PCP):  Jorge Tim MD    NPI# [id number]      Referring Provider:  Jorge Tim MD, NPI# [id number]      Chief Complaint   2019 9:02 AM CST    HTN check        History of Present Illness             The patient presents for follow-up evaluation of hypertension.  The quality of hypertension symptom(s) since the patient's last visit is described as decreased.  The context of the hypertension: blood pressure was maintained within the target range and better.  Associated symptoms consist of none.  Compliance problems: none.     Also follow up on hyperlipidemia taking meds as directed      Review of Systems   Constitutional:  Negative.    Eye:  Negative.    Ear/Nose/Mouth/Throat:  Negative.    Respiratory:  Negative.    Cardiovascular:  Negative.    Gastrointestinal:  Negative.    Genitourinary:  Negative.    Hematology/Lymphatics:  Negative.    Endocrine:  Negative.    Immunologic:  Negative.    Musculoskeletal:  Negative.    Integumentary:  Negative.    Neurologic:  Negative.       Health Status   Allergies:    Allergic Reactions (Selected)  Severity Not Documented  Amoxicillin (Rash and hand swelling)   Medications:  (Selected)   Prescriptions  Prescribed  atorvastatin 20 mg oral tablet: 1 tab(s) ( 20 mg ), po, daily, # 90 tab(s), 1 Refill(s), Type: Maintenance, Pharmacy: [facility], 1 tab(s) Oral daily  hydroCHLOROthiazide 25 mg oral tablet: 1 tab(s) ( 25 mg ), po, daily, # 90 tab(s), 1 Refill(s), Type: Maintenance, Pharmacy: [facility], 1 tab(s) Oral daily  lisinopril 10 mg  oral tablet: 1 tab(s) ( 10 mg ), po, daily, # 90 tab(s), 1 Refill(s), Type: Maintenance, Pharmacy: [facility], 1 tab(s) Oral daily  Documented Medications  Documented  Fish Oil: po, 0 Refill(s), Type: Maintenance  Multiple Vitamins oral tablet: 1 tab(s), po, daily, 0 Refill(s), Type: Maintenance  Vitamin C: daily, 0 Refill(s), Type: Maintenance  garlic oral tablet: 0 Refill(s), Type: Maintenance,    Medications          *denotes recorded medication          *Vitamin C: daily.          atorvastatin 20 mg oral tablet: 20 mg, 1 tab(s), po, daily, 90 tab(s), 1 Refill(s).          *garlic oral tablet: 0 Refill(s), Type: Maintenance.          hydroCHLOROthiazide 25 mg oral tablet: 25 mg, 1 tab(s), po, daily, 90 tab(s), 1 Refill(s).          lisinopril 10 mg oral tablet: 10 mg, 1 tab(s), po, daily, 90 tab(s), 1 Refill(s).          *Multiple Vitamins oral tablet: 1 tab(s), po, daily.          *Fish Oil: po.     Problem list:    All Problems  Hypertension / SNOMED CT 92852191 / Confirmed  Elevated cholesterol / SNOMED CT 50388788 / Confirmed  Obesity / SNOMED CT 6343788460 / Probable  Degenerative arthritis of knee / SNOMED CT 473607671 / Confirmed  Seasonal allergies / SNOMED CT 805528082 / Confirmed      Histories   Past Medical History:    Active  Degenerative arthritis of knee (682445816)  Comments:  10/13/2014 CDT 9:12 AM CDT - Shivani Ayala  Bilateral  Elevated cholesterol (53128091)  Seasonal allergies (745084318)   Family History:    No family history items have been selected or recorded.   Procedure history:    Mammogram (SNOMED CT 254009491) on 8/3/2016 at 66 Years.  Comments:  8/4/2016 7:51 PM CDT - Leni Cantu CMA  ACR 1 Negative; recommend annual mammograms  Screening for malignant neoplasm of colon (SNOMED CT 0846629053) performed by Joreg Tim MD on 7/20/2016 at 66 Years.  Comments:  8/1/2016 6:54 PM CDT - Misty GOTTLIEB, Estefani Lock result--negative  F/U:   3yrs   Social History:         Alcohol Assessment: Current            Current                     Comments:                      07/07/2017 - Shivani Ayala                     Occasionally      Tobacco Assessment: Denies Tobacco Use            Never      Substance Abuse Assessment: Denies Substance Abuse            Never      Employment and Education Assessment            Retired      Home and Environment Assessment            Marital status: .  Spouse/Partner name: Yao Trujillo.      Nutrition and Health Assessment            Type of diet: Regular.      Exercise and Physical Activity Assessment: Does not exercise            Exercise frequency: Does not exercise.      Physical Examination   Vital Signs   1/16/2019 9:02 AM CST Temperature Tympanic 97.7 DegF  LOW    Peripheral Pulse Rate 82 bpm    HR Method Electronic    Systolic Blood Pressure 125 mmHg    Diastolic Blood Pressure 81 mmHg  HI    Mean Arterial Pressure 96 mmHg    BP Method Electronic      Measurements from flowsheet : Measurements   1/16/2019 9:02 AM CST    Weight Measured - Standard                224.8 lb     General:  Alert and oriented, No acute distress.    Neck:  Supple, Non-tender.    Respiratory:  Lungs are clear to auscultation, Respirations are non-labored.    Cardiovascular:  Normal rate, Regular rhythm.    Integumentary:  No rash.       Health Maintenance      Recommendations     Pending (in the next year)        OverDue           Type 2 Diabetes Mellitus Screen (Female) due  11/05/15  and every 1  year(s)           Alcohol Misuse Screen (Female) due  07/07/18  and every 1  year(s)           Depression Screen (Female) due  07/07/18  and every 1  year(s)        Due            Aspirin Therapy for Prevention of CVD (Female) due  01/16/19  and every 5  year(s)           Fall Risk Screen (Female) due  01/16/19  and every 1  year(s)           Hepatitis C Screen [id number] (Female) due  01/16/19  One-time only           Lung Cancer Screen (Female) due  01/16/19  and  every 1  year(s)           Zoster/Shingles Vaccine due  01/16/19  One-time only        Due In Future            Body Mass Index Check (Female) not due until  07/11/19  and every 1  year(s)           Lipid Disorders Screen (Female) not due until  07/11/19  and every 1  year(s)           Colorectal Cancer Screen (Colonoscopy) (Female) not due until  07/20/19  and every 3  year(s)           Colorectal Cancer Screen (Occult Blood) (Female) not due until  07/20/19  and every 3  year(s)           Breast Cancer Screen not due until  08/14/19  and every 1  year(s)           Influenza Vaccine not due until  10/02/19  and every 1  year(s)     Satisfied (in the past 1 year)        Satisfied            Body Mass Index Check (Female) on  07/11/18.           Breast Cancer Screen on  08/14/18.           Breast Cancer Screen on  08/14/18.           High Blood Pressure Screen (Female) on  01/16/19.           High Blood Pressure Screen (Female) on  07/11/18.           High Blood Pressure Screen (Female) on  07/11/18.           Influenza Vaccine on  10/02/18.           Lipid Disorders Screen (Female) on  07/11/18.           Lipid Disorders Screen (Female) on  07/11/18.           Lipid Disorders Screen (Female) on  07/11/18.           Lipid Disorders Screen (Female) on  07/11/18.           Obesity Screen and Counseling (Female) on  01/16/19.           Obesity Screen and Counseling (Female) on  07/11/18.           Osteoporosis Screen on  08/14/18.           Tobacco Use Screen (Female) on  01/16/19.           Tobacco Use Screen (Female) on  07/11/18.      Impression and Plan   Diagnosis     Hypertension (LYX66-YM I10).     Elevated cholesterol (GUU87-WC E78.00).     Course:  Progressing as expected.    Plan:  fu 6 mo, BMI,Weight loss,exercise,diet discussed.    Patient Instructions:       Counseled: Patient, Regarding diagnosis, Regarding treatment, Regarding medications.

## 2022-02-15 NOTE — NURSING NOTE
Comprehensive Intake Entered On:  9/23/2020 8:50 AM CDT    Performed On:  9/23/2020 8:46 AM CDT by Umu Miller CMA               Summary   Chief Complaint :   f/u labs    Advance Directive :   Yes   Weight Measured :   226.3 lb(Converted to: 226 lb 5 oz, 102.65 kg)    Height Measured :   64 in(Converted to: 5 ft 4 in, 162.56 cm)    Body Mass Index :   38.84 kg/m2 (HI)    Body Surface Area :   2.15 m2   Systolic Blood Pressure :   170 mmHg (HI)    Diastolic Blood Pressure :   86 mmHg (HI)    Mean Arterial Pressure :   114 mmHg   Peripheral Pulse Rate :   88 bpm   BP Site :   Right arm   BP Method :   Electronic   HR Method :   Electronic   Temperature Tympanic :   98.7 DegF(Converted to: 37.1 DegC)    Umu Miller CMA - 9/23/2020 8:46 AM CDT   Health Status   Allergies Verified? :   Yes   Medication History Verified? :   Yes   Pre-Visit Planning Status :   Completed   Tobacco Use? :   Never smoker   Umu Miller CMA - 9/23/2020 8:46 AM CDT   Consents   Consent for Immunization Exchange :   Consent Granted   Consent for Immunizations to Providers :   Consent Granted   Umu Miller CMA - 9/23/2020 8:46 AM CDT   Meds / Allergies   (As Of: 9/23/2020 8:50:03 AM CDT)   Allergies (Active)   amoxicillin  Estimated Onset Date:   Unspecified ; Reactions:   rash, hand swelling ; Created By:   Marry Montalvo RN; Reaction Status:   Active ; Category:   Drug ; Substance:   amoxicillin ; Type:   Allergy ; Updated By:   Marry Montalvo RN; Reviewed Date:   7/23/2020 9:27 AM CDT        Medication List   (As Of: 9/23/2020 8:50:03 AM CDT)   Prescription/Discharge Order    amLODIPine  :   amLODIPine ; Status:   Prescribed ; Ordered As Mnemonic:   amLODIPine 5 mg oral tablet ; Simple Display Line:   5 mg, 1 tab(s), Oral, daily, 90 tab(s), 0 Refill(s) ; Ordering Provider:   Jorge Tim MD; Catalog Code:   amLODIPine ; Order Dt/Tm:   9/17/2020 12:35:33 PM CDT          atorvastatin  :   atorvastatin ; Status:    Prescribed ; Ordered As Mnemonic:   atorvastatin 20 mg oral tablet ; Simple Display Line:   20 mg, 1 tab(s), po, daily, 90 tab(s), 1 Refill(s) ; Ordering Provider:   Jorge Tim MD; Catalog Code:   atorvastatin ; Order Dt/Tm:   7/23/2020 9:21:08 AM CDT          hydroCHLOROthiazide  :   hydroCHLOROthiazide ; Status:   Suspended ; Ordered As Mnemonic:   hydroCHLOROthiazide 25 mg oral tablet ; Simple Display Line:   25 mg, 1 tab(s), po, daily, 90 tab(s), 1 Refill(s) ; Ordering Provider:   Jorge Tim MD; Catalog Code:   hydroCHLOROthiazide ; Order Dt/Tm:   7/23/2020 9:21:07 AM CDT          lisinopril  :   lisinopril ; Status:   Prescribed ; Ordered As Mnemonic:   lisinopril 10 mg oral tablet ; Simple Display Line:   10 mg, 1 tab(s), po, daily, 90 tab(s), 1 Refill(s) ; Ordering Provider:   Jorge Tim MD; Catalog Code:   lisinopril ; Order Dt/Tm:   7/23/2020 9:21:06 AM CDT            Home Meds    ascorbic acid  :   ascorbic acid ; Status:   Documented ; Ordered As Mnemonic:   Vitamin C ; Simple Display Line:   daily ; Catalog Code:   ascorbic acid ; Order Dt/Tm:   10/10/2014 8:57:56 AM CDT          garlic  :   garlic ; Status:   Documented ; Ordered As Mnemonic:   garlic oral tablet ; Catalog Code:   garlic ; Order Dt/Tm:   10/27/2014 8:57:13 AM CDT          multivitamin  :   multivitamin ; Status:   Documented ; Ordered As Mnemonic:   Multiple Vitamins oral tablet ; Simple Display Line:   1 tab(s), po, daily ; Catalog Code:   multivitamin ; Order Dt/Tm:   10/10/2014 8:57:49 AM CDT          omega-3 polyunsaturated fatty acids  :   omega-3 polyunsaturated fatty acids ; Status:   Documented ; Ordered As Mnemonic:   Fish Oil ; Simple Display Line:   po ; Catalog Code:   omega-3 polyunsaturated fatty acids ; Order Dt/Tm:   10/27/2014 8:57:25 AM CDT            ID Risk Screen   Recent Travel History :   No recent travel   Family Member Travel History :   No recent travel   Other Exposure to Infectious  Disease :   Unknown   Paul STRICKLAND, Umu - 9/23/2020 8:46 AM CDT

## 2022-02-15 NOTE — NURSING NOTE
Vitals/Measurements Entered On:  9/16/2020 9:33 AM CDT    Performed On:  9/16/2020 9:26 AM CDT by Leni Cantu LPN               Vital Signs   Systolic Blood Pressure :   119 mmHg   Diastolic Blood Pressure :   82 mmHg (HI)    Mean Arterial Pressure :   94 mmHg   BP Site :   Right arm   Peripheral Pulse Rate :   84 bpm   Vital Signs Comments :   CSS only vital signs check   Temperature Tympanic :   97.9 DegF(Converted to: 36.6 DegC)    Leni Cantu LPN - 9/16/2020 9:26 AM CDT

## 2022-02-15 NOTE — NURSING NOTE
Comprehensive Intake Entered On:  10/14/2020 9:07 AM CDT    Performed On:  10/14/2020 9:04 AM CDT by Shivani Garcia CMA               Summary   Chief Complaint :   renal consult per TFS    Advance Directive :   Yes   Weight Measured :   226 lb(Converted to: 226 lb 0 oz, 102.512 kg)    Height Measured :   64 in(Converted to: 5 ft 4 in, 162.56 cm)    Body Mass Index :   38.79 kg/m2 (HI)    Body Surface Area :   2.15 m2   Systolic Blood Pressure :   150 mmHg (HI)    Diastolic Blood Pressure :   80 mmHg   Mean Arterial Pressure :   103 mmHg   Peripheral Pulse Rate :   72 bpm   Temperature Tympanic :   98.3 DegF(Converted to: 36.8 DegC)    Shivani Garcia CMA - 10/14/2020 9:04 AM CDT   Health Status   Allergies Verified? :   Yes   Medication History Verified? :   Yes   Medical History Verified? :   Yes   Pre-Visit Planning Status :   Completed   Tobacco Use? :   Never smoker   Shivani Garcia CMA - 10/14/2020 9:04 AM CDT   ID Risk Screen   Recent Travel History :   No recent travel   Family Member Travel History :   No recent travel   Other Exposure to Infectious Disease :   Unknown   Shivani Garcia CMA - 10/14/2020 9:04 AM CDT   Social History   Social History   (As Of: 10/14/2020 9:07:47 AM CDT)   Alcohol:  Current      Wine (5 oz), 1-2 times per year, 1 drinks/episode average.  2 drinks/episode maximum.  Ready to change: No.   Comments:  7/7/2017 10:45 AM - Shivani Ayala: Occasionally   (Last Updated: 8/7/2020 9:57:05 AM CDT by Margaret Lynne)          Tobacco:  Denies Tobacco Use      Never (less than 100 in lifetime)   (Last Updated: 7/12/2019 1:58:40 PM CDT by Heather Rutherford)          Substance Abuse:  Denies Substance Abuse      Never   (Last Updated: 10/10/2014 3:12:10 PM CDT by Stephanie Regan)          Employment/School:        Retired   (Last Updated: 10/10/2014 3:11:47 PM CDT by Stephanie Regan)          Home/Environment:        Marital status: .  Spouse/Partner name: Yao Au.  Living situation:  Home/Independent.  Injuries/Abuse/Neglect in household: No.  Feels unsafe at home: No.  Family/Friends available for support: Yes.  Risks in environment: Stairs.   (Last Updated: 8/7/2020 9:56:28 AM CDT by Margaret Lynne)          Nutrition/Health:        Type of diet: Regular.  Wants to lose weight: Yes.  Sleeping concerns: No.  Feels highly stressed: No.   (Last Updated: 7/12/2019 1:59:34 PM CDT by Heather Rutherford)          Exercise:  Does not exercise      Exercise frequency: Never.   (Last Updated: 7/12/2019 1:59:51 PM CDT by Heather Rutherford)          Sexual:        Sexually active: Yes.  Identifies as female, Sexual orientation: Straight or heterosexual.  History of STD: No.  History of sexual abuse: No.   (Last Updated: 8/7/2020 9:57:48 AM CDT by Margaret Lynne)

## 2022-02-15 NOTE — PROGRESS NOTES
Patient:   JULIEN WU            MRN: [id number]            FIN: [id number]               Age:   67 years     Sex:  Female     :  1950   Associated Diagnoses:   Hypertension; Elevated cholesterol   Author:   Jorge Tim MD      Visit Information      Date of Service: 2018 09:01 am  Performing Location: [facility]  Encounter#: [id number]      Primary Care Provider (PCP):  Jorge Tim MD    NPI# [id number]      Referring Provider:  Jorge Tim MD# [id number]      Chief Complaint   1/3/2018 9:15 AM CST     HTN check up.        History of Present Illness             The patient presents for follow-up evaluation of hypertension.  The quality of hypertension symptom(s) since the patient's last visit is described as decreased.  The context of the hypertension: blood pressure was maintained within the target range and better.  Associated symptoms consist of none.  Compliance problems: none.     Also follow up on hyperlipidemia taking meds as directed      Review of Systems   Constitutional:  Negative.    Eye:  Negative.    Ear/Nose/Mouth/Throat:  Negative.    Respiratory:  Negative.    Cardiovascular:  Negative.    Gastrointestinal:  Negative.    Genitourinary:  Negative.    Hematology/Lymphatics:  Negative.    Endocrine:  Negative.    Immunologic:  Negative.    Musculoskeletal:  Negative.    Integumentary:  Negative.    Neurologic:  Negative.       Health Status   Allergies:    Allergic Reactions (Selected)  No Known Medication Allergies   Medications:  (Selected)   Prescriptions  Prescribed  Lipitor 20 mg oral tablet: 1 tab(s) ( 20 mg ), PO, Daily, # 90 tab(s), 1 Refill(s), Type: Maintenance, Pharmacy: [facility], 1 tab(s) po daily  amoxicillin 875 mg oral tablet: 1 tab(s) ( 875 mg ), PO, BID, # 20 tab(s), 0 Refill(s), Type: Maintenance, [facility], 1 tab(s) po bid,x10 day(s)  hydroCHLOROthiazide 25 mg oral tablet: 1 tab(s) ( 25  mg ), PO, Daily, # 90 tab(s), 1 Refill(s), Type: Maintenance, Pharmacy: [facility], 1 tab(s) po daily  lisinopril 10 mg oral tablet: 1 tab(s) ( 10 mg ), PO, Daily, # 90 tab(s), 1 Refill(s), Type: Maintenance, Pharmacy: [facility], 1 tab(s) po daily  Documented Medications  Documented  Fish Oil: po, 0 Refill(s), Type: Maintenance  Multiple Vitamins oral tablet: 1 tab(s), po, daily, 0 Refill(s), Type: Maintenance  Vitamin C: daily, 0 Refill(s), Type: Maintenance  garlic oral tablet: 0 Refill(s), Type: Maintenance   Problem list:    All Problems  Hypertension / SNOMED CT 11089463 / Confirmed  Elevated cholesterol / SNOMED CT 18821672 / Confirmed  Obesity / SNOMED CT 4646683427 / Probable  Degenerative arthritis of knee / SNOMED CT 322368634 / Confirmed  Seasonal allergies / SNOMED CT 439563587 / Confirmed      Histories   Past Medical History:    Active  Degenerative arthritis of knee (506533272)  Comments:  10/13/2014 CDT 9:12 AM CDT - Shivani Ayala  Bilateral  Elevated cholesterol (85415728)  Seasonal allergies (071282527)   Family History:    No family history items have been selected or recorded.   Procedure history:    Mammogram (SNOMED CT 748495325) on 8/3/2016 at 66 Years.  Comments:  8/4/2016 7:51 PM - Leni Cantu CMA  ACR 1 Negative; recommend annual mammograms  Screening for malignant neoplasm of colon (SNOMED CT 5047335655) performed by Jorge Tim MD on 7/20/2016 at 66 Years.  Comments:  8/1/2016 6:54 PM - Estefani Jay MA result--negative  F/U:   3yrs   Social History:        Alcohol Assessment: Current            Current                     Comments:                      07/07/2017 - Shivani Ayala                     Occasionally      Tobacco Assessment: Denies Tobacco Use            Never      Substance Abuse Assessment: Denies Substance Abuse            Never      Employment and Education Assessment            Retired      Home and Environment Assessment             Marital status: .  Spouse/Partner name: Yao Trujillo.      Nutrition and Health Assessment            Type of diet: Regular.      Exercise and Physical Activity Assessment: Does not exercise            Exercise frequency: Does not exercise.        Physical Examination   Vital Signs   1/3/2018 9:15 AM CST Temperature Tympanic 98.4 DegF    Peripheral Pulse Rate 91 bpm    HR Method Electronic    Systolic Blood Pressure 122 mmHg    Diastolic Blood Pressure 86 mmHg  HI    Mean Arterial Pressure 98 mmHg    BP Method Electronic      Measurements from flowsheet : Measurements   1/3/2018 9:15 AM CST     Weight Measured - Standard                232.4 lb     General:  Alert and oriented, No acute distress.    Neck:  Supple, Non-tender.    Respiratory:  Lungs are clear to auscultation, Respirations are non-labored.    Cardiovascular:  Normal rate, Regular rhythm.    Integumentary:  No rash.       Health Maintenance      Recommendations     Pending (in the next year)        OverDue           Osteoporosis Screen due  10/30/16  and every 2  year(s)        Due            Aspirin Therapy for Prevention of CVD (Female) due  01/03/18  and every 5  year(s)           Fall Risk Screen (Female) due  01/03/18  and every 1  year(s)           Lung Cancer Screen (Female) due  01/03/18  and every 1  year(s)           Zoster/Shingles Vaccine due  01/03/18  One-time only        Due In Future            Body Mass Index Check (Female) not due until  07/07/18  and every 1  year(s)           Alcohol Misuse Screen (Female) not due until  07/07/18  and every 1  year(s)           Depression Screen (Female) not due until  07/07/18  and every 1  year(s)           Lipid Disorders Screen (Female) not due until  07/07/18  and every 1  year(s)           Breast Cancer Screen not due until  08/07/18  and every 1  year(s)           Influenza Vaccine not due until  10/18/18  and every 1  year(s)     Satisfied (in the past 1 year)        Satisfied             Alcohol Misuse Screen (Female) on  07/07/17.           Body Mass Index Check (Female) on  07/07/17.           Body Mass Index Check (Female) on  01/04/17.           Breast Cancer Screen on  08/07/17.           Breast Cancer Screen on  08/07/17.           Depression Screen (Female) on  07/07/17.           Depression Screen (Female) on  07/07/17.           Depression Screen (Female) on  07/07/17.           High Blood Pressure Screen (Female) on  01/03/18.           High Blood Pressure Screen (Female) on  07/07/17.           High Blood Pressure Screen (Female) on  01/04/17.           Influenza Vaccine on  10/18/17.           Lipid Disorders Screen (Female) on  07/07/17.           Lipid Disorders Screen (Female) on  07/07/17.           Lipid Disorders Screen (Female) on  07/07/17.           Lipid Disorders Screen (Female) on  07/07/17.           Obesity Screen and Counseling (Female) on  01/03/18.           Obesity Screen and Counseling (Female) on  07/07/17.           Obesity Screen and Counseling (Female) on  01/04/17.           Pneumococcal Vaccine on  07/07/17.           Tobacco Use Screen (Female) on  01/03/18.           Tobacco Use Screen (Female) on  07/07/17.           Tobacco Use Screen (Female) on  01/04/17.        Impression and Plan   Diagnosis     Hypertension (MWY26-TA I10).     Elevated cholesterol (AOP94-UW E78.0).     Course:  Progressing as expected.    Plan:  fu 6 mo, BMI,Weight loss,exercise,diet discussed.    Patient Instructions:       Counseled: Patient, Regarding diagnosis, Regarding treatment, Regarding medications.

## 2022-02-15 NOTE — TELEPHONE ENCOUNTER
Entered by Cori Quinn CMA on December 29, 2020 12:46:46 PM CST  ---------------------  From: Cori Quinn CMA   To: [place] [id number]    Sent: 12/29/2020 12:46:46 PM CST  Subject: Medication Management     ** Submitted: **  Order:lisinopril (lisinopril 10 mg oral tablet)  1 tab(s)  Oral  daily  Qty:  30 tab(s)        Refills:  0          Substitutions Allowed     Route To Pharmacy - [place] [address]    Signed by Cori Quinn CMA  12/29/2020 6:46:00 PM [facility]    ** Submitted: **  Complete:lisinopril (lisinopril 10 mg oral tablet)   Signed by Cori Quinn CMA  12/29/2020 6:46:00 PM [facility]    ** Not Approved:  **  lisinopril (Lisinopril 10 MG Oral Tablet)  Take 1 tablet by mouth once daily  Qty:  90 tab(s)        Days Supply:  90        Refills:  0          Substitutions Allowed     Route To Pharmacy - [place] [address]   Signed by Cori Quinn CMA            ------------------------------------------  From: [place] [address]  To: Jorge Tim MD  Sent: December 29, 2020 9:35:47 AM CST  Subject: Medication Management  Due: December 24, 2020 1:58:33 PM CST     ** On Hold Pending Signature **     Dispensed Drug: lisinopril (lisinopril 10 mg oral tablet), Take 1 tablet by mouth once daily  Quantity: 90 tab(s)  Days Supply: 90  Refills: 0  Substitutions Allowed  Notes from Pharmacy:  ------------------------------------------Appt made with hospitals on 1/13.

## 2022-02-15 NOTE — LETTER
(Inserted Image. Unable to display)   October 19, 2020      JULIEN WU  [address], [place] [id number]        Dear JULIEN VITALE,     Thank you for selecting [facility] ([facility]viously [facility]) for your healthcare needs. Below you will find the results of your recent test(s) done at our clinic.       Labs for your review.  Findings all look reassuring.  We can discuss in more detail at future clinic visit.       Result Name Current Result Previous Result Reference Range   Sodium Level (mmol/L)  137 10/14/2020 ((L)) 133 9/16/2020 135 - 146   Potassium Level (mmol/L)  4.4 10/14/2020  4.6 9/16/2020 3.5 - 5.3   Chloride Level (mmol/L)  101 10/14/2020 ((L)) 94 9/16/2020 98 - 110   CO2 Level (mmol/L)  28 10/14/2020  31 9/16/2020 20 - 32   Glucose Level (mg/dL) ((H)) 119 10/14/2020 ((H)) 100 9/16/2020 65 - 99   BUN (mg/dL)  17 10/14/2020 ((H)) 27 9/16/2020 7 - 25   Creatinine Level (mg/dL)  0.94 10/14/2020 ((H)) 1.20 9/16/2020 0.50 - 0.99   BUN/Creat Ratio  NOT APPLICABLE 10/14/2020 ((H)) 23 9/16/2020 6 - 22   eGFR (mL/min/1.73m2)  62 10/14/2020 ((L)) 46 9/16/2020 > OR = 60 -    eGFR  (mL/min/1.73m2)  72 10/14/2020 ((L)) 53 9/16/2020 > OR = 60 -    Calcium Level (mg/dL)  10.1 10/14/2020  9.9 9/16/2020 8.6 - 10.4   PTH Intact (pg/mL)  60 10/14/2020  14 - 64   Albumin Level (gm/dL)  4.4 10/14/2020  3.6 - 5.1   U Creatinine (mg/dL)  155 10/14/2020  20 - 275   U Microalbumin (mg/dL)  1.4 10/14/2020  See Note: -    Ur Microalbumin/Creatinine Ratio  9 10/14/2020   - <30   Hgb (gm/dL)  14.8 10/14/2020  11.7 - 15.5   UA Color  YELLOW 10/14/2020  YELLOW -    UA Appear  CLEAR 10/14/2020  CLEAR -    UA pH  6.5 10/14/2020  5.0 - 8.0   UA Specific [place]  1.020 10/14/2020  1.001 - 1.035   UA Glucose  NEGATIVE 10/14/2020  NEGATIVE - NEGATIVE   UA Bilirubin  NEGATIVE 10/14/2020  NEGATIVE - NEGATIVE   UA Ketones  NEGATIVE 10/14/2020  NEGATIVE - NEGATIVE   UA Blood  NEGATIVE  10/14/2020  NEGATIVE - NEGATIVE   UA Protein  NEGATIVE 10/14/2020  NEGATIVE - NEGATIVE   UA Nitrite  NEGATIVE 10/14/2020  NEGATIVE - NEGATIVE   UA Leukocyte Esterase  NEGATIVE 10/14/2020  NEGATIVE - NEGATIVE   UA Squamous Epithelial Cells (/HPF) ((A)) 6-10 10/14/2020   - < OR = 5   UA Hyaline Cast (/LPF)  NONE SEEN 10/14/2020  NONE SEEN -    UA WBC (/HPF)  NONE SEEN 10/14/2020   - < OR = 5   UA RBC (/HPF)  0-2 10/14/2020   - < OR = 2   UA Calcium Oxalate Crystals (/HPF)  FEW 10/14/2020  NONE OR FEW -    UA Bacteria (/HPF)  NONE SEEN 10/14/2020  NONE SEEN -        Please contact me or my assistant at [phone] if you have any questions or concerns.     Sincerely,        Ghanshyam Cheney MD    What do your labs mean?  Below is a glossary of commonly ordered labs:  LDL - Bad Cholesterol  HDL - Good Cholesterol  AST/ALT - Liver Function  Cr/Creatinine - Kidney Function  Microalbumin - Kidney Function  BUN - Kidney Function  PSA - Prostate   TSH - Thyroid Hormone  HgbA1c - Diabetes Test  Hgb (Hemoglobin) - Red Blood Cells

## 2022-02-15 NOTE — NURSING NOTE
Comprehensive Intake Entered On:  1/16/2019 9:03 AM CST    Performed On:  1/16/2019 9:02 AM CST by Stephanie Regan               Summary   Chief Complaint :   HTN check   Advance Directive :   Yes   Weight Measured :   224.8 lb(Converted to: 224 lb 13 oz, 101.97 kg)    Systolic Blood Pressure :   125 mmHg   Diastolic Blood Pressure :   81 mmHg (HI)    Mean Arterial Pressure :   96 mmHg   Peripheral Pulse Rate :   82 bpm   BP Method :   Electronic   HR Method :   Electronic   Temperature Tympanic :   97.7 DegF(Converted to: 36.5 DegC)  (LOW)    Stephanie Regan - 1/16/2019 9:02 AM CST   Health Status   Allergies Verified? :   Yes   Medication History Verified? :   Yes   Pre-Visit Planning Status :   Completed   Tobacco Use? :   Never smoker   Stephanie Regan - 1/16/2019 9:02 AM CST

## 2022-02-15 NOTE — LETTER
(Inserted Image. Unable to display)   July 24, 2020      JULIEN WU      [address] [place], [place] [phone]        Dear JULIEN VITALE,    Thank you for selecting [facility] for your healthcare needs.  Below you will find the results of the recent tests done at our clinic.     There has been some decrease in kidney function. Please set up visit in 2 months to recheck labs. We may need to change some of your medications. Stay well hydrated.      Result Name Current Result Previous Result Reference Range   Potassium Level (mmol/L)  4.4 7/23/2020  4.6 7/12/2019 3.5 - 5.3   Glucose Level (mg/dL) ((H)) 118 7/23/2020 ((H)) 100 7/12/2019 65 - 99   Creatinine Level (mg/dL) ((H)) 1.23 7/23/2020 ((H)) 1.03 7/12/2019 0.50 - 0.99   eGFR (mL/min/1.73m2) ((L)) 45 7/23/2020 ((L)) 56 7/12/2019 > OR = 60 -    Cholesterol (mg/dL)  191 7/23/2020  184 7/12/2019  - <200   HDL (mg/dL)  56 7/23/2020 ((L)) 49 7/12/2019 > OR = 50 -    LDL ((H)) 105 7/23/2020 ((H)) 102 7/12/2019    Triglyceride (mg/dL) ((H)) 187 7/23/2020 ((H)) 212 7/12/2019  - <150       Please contact me or my assistant at [phone] if you have any questions.     Sincerely,        Jorge Tim MD        What do your labs mean?  Below is a glossary of commonly ordered labs:  LDL   Bad Cholesterol   HDL   Good Cholesterol  AST/ALT   Liver Function   Cr/Creatinine   Kidney Function  Microalbumin   Kidney Function  BUN   Kidney Function  PSA   Prostate    TSH   Thyroid Hormone  HgbA1c   Diabetes Test   Hgb (Hemoglobin)   Red Blood Cells

## 2022-02-15 NOTE — PROGRESS NOTES
Patient:   JULIEN WU            MRN: [id number]            FIN: [id number]               Age:   66 years     Sex:  Female     :  1950   Associated Diagnoses:   Hypertension; Elevated cholesterol   Author:   Jorge Tim MD      Visit Information      Date of Service: 2017 09:08 am  Performing Location: [facility]  Encounter#: [id number]      Primary Care Provider (PCP):  Jorge Tim MD    NPI# [id number]      Referring Provider:  No referring provider recorded for selected visit.      Chief Complaint   2017 9:14 AM CST     HTN check up.        History of Present Illness             The patient presents for follow-up evaluation of hypertension.  The quality of hypertension symptom(s) since the patient's last visit is described as decreased.  The context of the hypertension: blood pressure was maintained within the target range and better.  Associated symptoms consist of none.  Compliance problems: none.     Also follow up on hyperlipidemia taking meds as directed      Review of Systems   Constitutional:  Negative.    Eye:  Negative.    Ear/Nose/Mouth/Throat:  Negative.    Respiratory:  Negative.    Cardiovascular:  Negative.    Gastrointestinal:  Negative.    Genitourinary:  Negative.    Hematology/Lymphatics:  Negative.    Endocrine:  Negative.    Immunologic:  Negative.    Musculoskeletal:  Negative.    Integumentary:  Negative.    Neurologic:  Negative.       Health Status   Allergies:    Allergic Reactions (Selected)  No Known Medication Allergies   Medications:  (Selected)   Prescriptions  Prescribed  Lipitor 20 mg oral tablet: 1 tab(s) ( 20 mg ), PO, Daily, # 90 tab(s), 1 Refill(s), Type: Maintenance, Pharmacy: [facility], 1 tab(s) po daily  hydrochlorothiazide 25 mg oral tablet: 1 tab(s) ( 25 mg ), PO, Daily, # 90 tab(s), 1 Refill(s), Type: Maintenance, Pharmacy: [facility], 1 tab(s) po daily  lisinopril 10 mg oral tablet: 1 tab(s) (  10 mg ), PO, Daily, # 90 tab(s), 1 Refill(s), Type: Maintenance, Pharmacy: [facility], 1 tab(s) po daily  Documented Medications  Documented  Fish Oil: po, 0 Refill(s), Type: Maintenance  Multiple Vitamins oral tablet: 1 tab(s), po, daily, 0 Refill(s), Type: Maintenance  Vitamin C: daily, 0 Refill(s), Type: Maintenance  garlic oral tablet: 0 Refill(s), Type: Maintenance   Problem list:    All Problems  Obesity / SNOMED CT 1705157663 / Probable  Degenerative arthritis of knee / SNOMED CT 204494926 / Confirmed  Elevated cholesterol / SNOMED CT 32688001 / Confirmed  Hypertension / SNOMED CT 83548646 / Confirmed      Histories   Past Medical History:    Active  Degenerative arthritis of knee (857830472)  Comments:  10/13/2014 CDT 9:12 AM CDT - Shivani Ayala  Bilateral  Elevated cholesterol (47204363)   Family History:    No family history items have been selected or recorded.   Procedure history:    Mammogram (SNOMED CT 223624590) on 8/3/2016 at 66 Years.  Comments:  8/4/2016 7:51 PM - Leni Cantu CMA  ACR 1 Negative; recommend annual mammograms  Screening for malignant neoplasm of colon (SNOMED CT 6882158888) performed by Jorge Tim MD on 7/20/2016 at 66 Years.  Comments:  8/1/2016 6:54 PM - Estefani Jay MA result--negative  F/U:   3yrs   Social History:        Alcohol Assessment            Never      Tobacco Assessment            Never      Substance Abuse Assessment            Never      Employment and Education Assessment            Retired      Home and Environment Assessment            Marital status: .  Spouse/Partner name: Yao Trujillo.      Nutrition and Health Assessment            Type of diet: Regular.      Exercise and Physical Activity Assessment            Exercise frequency: Does not exercise.        Physical Examination   Vital Signs   1/4/2017 9:14 AM CST Temperature Tympanic 98.2 DegF    Peripheral Pulse Rate 97 bpm    Systolic Blood Pressure 132 mmHg     Diastolic Blood Pressure 84 mmHg    Mean Arterial Pressure 100 mmHg      Measurements from flowsheet : Measurements   1/4/2017 9:14 AM CST Height Measured - Standard 63.5 in    Weight Measured - Standard 231.6 lb    BSA 2.17 m2    Body Mass Index 40.38 kg/m2      General:  Alert and oriented, No acute distress.    Neck:  Supple, Non-tender.    Respiratory:  Lungs are clear to auscultation, Respirations are non-labored.    Cardiovascular:  Normal rate, Regular rhythm.    Integumentary:  No rash.       Impression and Plan   Diagnosis     Hypertension (PQB64-GO I10).     Elevated cholesterol (BUJ38-IQ E78.0).     Course:  Progressing as expected.    Plan:  fu 6 mo, BMI,Weight loss,exercise,diet discussed.    Patient Instructions:       Counseled: Patient, Regarding diagnosis, Regarding treatment, Regarding medications.

## 2022-02-15 NOTE — LETTER
(Inserted Image. Unable to display)   July 13, 2019      JULIEN WU      [address], [place] [id number]        Dear JULIEN VITALE,    Thank you for selecting [facility] for your healthcare needs.  Below you will find the results of the recent tests done at our clinic.     All labs acceptable.      Result Name Current Result Previous Result Reference Range   Potassium Level (mmol/L)  4.6 7/12/2019  4.3 7/11/2018 3.5 - 5.3   Glucose Level (mg/dL) ((H)) 100 7/12/2019 ((H)) 103 7/11/2018 65 - 99   Creatinine Level (mg/dL) ((H)) 1.03 7/12/2019 ((H)) 1.09 7/11/2018 0.50 - 0.99   Cholesterol (mg/dL)  184 7/12/2019  185 7/11/2018  - <200   HDL (mg/dL) ((L)) 49 7/12/2019 ((L)) 49 7/11/2018 >50 -    LDL ((H)) 102 7/12/2019 ((H)) 103 7/11/2018    Triglyceride (mg/dL) ((H)) 212 7/12/2019 ((H)) 211 7/11/2018  - <150       Please contact me or my assistant at [phone] if you have any questions.     Sincerely,        Jorge Tim MD        What do your labs mean?  Below is a glossary of commonly ordered labs:  LDL   Bad Cholesterol   HDL   Good Cholesterol  AST/ALT   Liver Function   Cr/Creatinine   Kidney Function  Microalbumin   Kidney Function  BUN   Kidney Function  PSA   Prostate    TSH   Thyroid Hormone  HgbA1c   Diabetes Test   Hgb (Hemoglobin)   Red Blood Cells

## 2022-02-15 NOTE — PROGRESS NOTES
Patient:   JULIEN WU            MRN: [id number]            FIN: [id number]               Age:   71 years     Sex:  Female     :  1950   Associated Diagnoses:   Encounter for Medicare annual wellness exam; Elevated cholesterol; Hypertension; Chronic gout   Author:   Jorge Tim MD      Visit Information      Date of Service: 2021 09:20 am  Performing Location: [facility]  Encounter#: [id number]      Primary Care Provider (PCP):  Jorge Tim MD# [id number]      Referring Provider:  Jorge Tim MD# [id number]      Chief Complaint   2021 9:52 AM CDT    AWV MEDICARE VISIT   Medicare Initial / Annual Preventative Visit      Well Adult History   Well Adult History             The patient presents for well adult exam.  ADL's and Safety were reviewed.  ___none___ found.  Please see copy of scanned form.    I have reviewed with the patient the completed health risk assessment and health history form and we have agreed on the following plans for identified risk factors. ___none____ found.  Please see copy of scanned form.  She continues to live independently with her .  No flares of her gout since she has been off the diuretic.  No falls does struggle with her arthritis some       .        Review of Systems   Eye:  See Preventative Services Checklist for Vision/Glaucoma Test dates..    Ear/Nose/Mouth/Throat:  Negative, Hearing is evaluated..    Respiratory:  Negative.    Cardiovascular:  Negative.    Gastrointestinal:  Negative.    Genitourinary:  Negative.    Integumentary:  Negative.    Neurologic:  Negative.    Psychiatric:  PHQ-9 Noted.    See completed Health History for Review of Systems.      Health Status   Allergies:    Allergic Reactions (Selected)  Severity Not Documented  Amoxicillin (Rash and hand swelling)   Medications:  (Selected)   Prescriptions  Prescribed  amLODIPine 5 mg oral tablet: = 1 tab(s), Oral, daily, # 90 tab(s), 0  Refill(s), Type: Maintenance, Pharmacy: [facility], 1 tab(s) Oral daily, 64, in, 01/13/21 9:48:00 CST, Height Measured, 225.8, lb, 01/13/21 9:36:00 CST, Weight Measured  atorvastatin 20 mg oral tablet: = 1 tab(s) ( 20 mg ), po, daily, # 90 tab(s), 1 Refill(s), Type: Maintenance, Pharmacy: [place] [id number], 1 tab(s) Oral daily, 64, in, 01/13/21 9:48:00 CST, Height Measured, 225.8, lb, 01/13/21 9:36:00 CST, Weight Measured  indomethacin 25 mg oral capsule: = 1 cap(s) ( 25 mg ), Oral, tid, PRN: for gout pain, # 30 cap(s), 0 Refill(s), Type: Maintenance, Pharmacy: [facility], 1 cap(s) Oral tid,PRN:for gout pain, 64, in, 10/14/20 9:04:00 CDT, Height Measured, 226, lb, 10/14/20 9:04:00 CDT, Weigh...  lisinopril 20 mg oral tablet: = 1 tab(s), Oral, daily, # 90 tab(s), 0 Refill(s), Type: Maintenance, Pharmacy: [place] [id number], 1 tab(s) Oral daily, 64, in, 01/13/21 9:48:00 CST, Height Measured, 225.8, lb, 01/13/21 9:36:00 CST, Weight Measured  metoprolol succinate 25 mg oral tablet, extended release: = 1 tab(s) ( 25 mg ), Oral, daily, # 90 tab(s), 1 Refill(s), Type: Maintenance, Pharmacy: [facility], 1 tab(s) Oral daily, 64, in, 01/13/21 9:48:00 CST, Height Measured, 225.8, lb, 01/13/21 9:36:00 CST, Weight Measured  oxybutynin 5 mg/24 hours oral tablet, extended release: = 1 tab(s) ( 5 mg ), Oral, daily, # 90 tab(s), 3 Refill(s), Type: Maintenance, Pharmacy: [id number], 1 tab(s) Oral daily, 64, in, 01/13/21 9:48:00 CST, Height Measured, 225.8, lb, 01/13/21 9:36:00 CST, Weight Measured  Documented Medications  Documented  Fish Oil: po, 0 Refill(s), Type: Maintenance  Multiple Vitamins oral tablet: 1 tab(s), po, daily, 0 Refill(s), Type: Maintenance  Vitamin C: daily, 0 Refill(s), Type: Maintenance  garlic oral tablet: 0 Refill(s), Type: Maintenance,    Medications          *denotes recorded medication          amLODIPine 5 mg oral tablet: 1 tab(s), Oral, daily, 90 tab(s),  0 Refill(s).          *Vitamin C: daily.          atorvastatin 20 mg oral tablet: 20 mg, 1 tab(s), po, daily, 90 tab(s), 1 Refill(s).          *garlic oral tablet: 0 Refill(s), Type: Maintenance.          indomethacin 25 mg oral capsule: 25 mg, 1 cap(s), Oral, tid, PRN: for gout pain, 30 cap(s), 0 Refill(s).          lisinopril 20 mg oral tablet: 1 tab(s), Oral, daily, 90 tab(s), 0 Refill(s).          metoprolol succinate 25 mg oral tablet, extended release: 25 mg, 1 tab(s), Oral, daily, 90 tab(s), 1 Refill(s).          *Multiple Vitamins oral tablet: 1 tab(s), po, daily.          *Fish Oil: po.          oxybutynin 5 mg/24 hours oral tablet, extended release: 5 mg, 1 tab(s), Oral, daily, 90 tab(s), 3 Refill(s).       Problem list:    All Problems  Hypertension / SNOMED CT 23785811 / Confirmed  Seasonal allergies / SNOMED CT 067744684 / Confirmed  Degenerative arthritis of knee / SNOMED CT 813130760 / Confirmed  Obesity / SNOMED CT 3917684564 / Probable  Elevated cholesterol / SNOMED CT 86984844 / Confirmed  Chronic gout / SNOMED CT 951020438 / Confirmed      Histories   Past Medical History:    Active  Obesity (2239554732)  Degenerative arthritis of knee (956959126)  Comments:  10/13/2014 CDT 9:12 AM CDT - Shivani Ayala  Bilateral  Elevated cholesterol (33446286)  Hypertension (57668980)  Seasonal allergies (100544603)  Chronic gout (725898916)   Family History:    Liver cancer  Mother  Hypertension  Mother  Heart disease  Father  Parkinson's disease  Mother  Alzheimer's disease  Mother     Procedure history:    Screening for colon cancer (SNOMED CT 9960451104) performed by Jorge Tim MD on 7/25/2019 at 69 Years.  Comments:  8/5/2019 3:29 PM CDT - Estefani aJy MA resutl:  negative  Recommendation:  f/u 3yrs  Mammogram (SNOMED CT 532861484) on 8/3/2016 at 66 Years.  Comments:  8/4/2016 7:51 PM MARKT - Leni Cantu CMA 1 Negative; recommend annual mammograms  Screening for malignant  neoplasm of colon (SNOMED CT 2271650348) performed by Jorge Tim MD on 7/20/2016 at 66 Years.  Comments:  8/1/2016 6:54 PM CDT - Estefani Jay MA result--negative  F/U:   3yrs   Social History:        Electronic Cigarette/Vaping Assessment            Electronic Cigarette Use: Never.      Alcohol Assessment: Current            Wine (5 oz), 1-2 times per year, 1 drinks/episode average.  2 drinks/episode maximum.  Ready to change: No.                     Comments:                      07/07/2017 - Shivani Ayala                     Occasionally      Tobacco Assessment            Never (less than 100 in lifetime)      Substance Abuse Assessment: Denies Substance Abuse            Never      Employment and Education Assessment            Retired      Home and Environment Assessment            Marital status: .  Spouse/Partner name: Yao Au.  Living situation: Home/Independent.               Injuries/Abuse/Neglect in household: No.  Feels unsafe at home: No.  Family/Friends available for support:               Yes.  Risks in environment: Stairs.      Nutrition and Health Assessment            Type of diet: Regular.  Wants to lose weight: Yes.  Sleeping concerns: No.  Feels highly stressed: No.      Exercise and Physical Activity Assessment: Does not exercise            Exercise frequency: Never.      Sexual Assessment            Sexually active: Yes.  Identifies as female, Sexual orientation: Straight or heterosexual.  History of STD:               No.  History of sexual abuse: No.        Physical Examination   Exam at Provider Discretion   Vital Signs   7/28/2021 9:52 AM CDT Peripheral Pulse Rate 60 bpm    Pulse Site Radial artery    HR Method Manual    Systolic Blood Pressure 132 mmHg  HI    Diastolic Blood Pressure 84 mmHg  HI    Mean Arterial Pressure 100 mmHg    BP Site Right arm    BP Method Manual      Measurements from flowsheet : Measurements   7/28/2021 9:52 AM CDT Height Measured -  Standard 64 in    Weight Measured - Standard 227 lb    BSA 2.15 m2    Body Mass Index 38.96 kg/m2  HI      General:  Alert and oriented, No acute distress.    Eye:  Pupils are equal, round and reactive to light, Extraocular movements are intact.    HENT:  Normocephalic, Tympanic membranes are clear, Normal hearing, Oral mucosa is moist.    Neck:  Supple, Non-tender, No carotid bruit, No jugular venous distention, No lymphadenopathy, No thyromegaly.    Respiratory:  Lungs are clear to auscultation, Respirations are non-labored, Breath sounds are equal.    Cardiovascular:  Normal rate, Regular rhythm, No murmur, Good pulses equal in all extremities, No edema.    Gastrointestinal:  Soft, Non-tender, Non-distended, Normal bowel sounds, No organomegaly.    Musculoskeletal:  Normal range of motion, Normal strength, degenerative changes noted.    Integumentary:  Warm, Dry.    Neurologic:  Alert, Oriented, Normal sensory, Normal motor function, No focal deficits, Cranial Nerves II-XII are grossly intact, Normal deep tendon reflexes, No signs of cognitive impairment..    Psychiatric:  Cooperative, Appropriate mood & affect, Normal judgment, Patient's PHQ9 and CAGE questionnaire reviewed and discussed with patient..       Review / Management   Results review:  INCLUDE PHQ 9 0.       Impression and Plan   Diagnosis     Encounter for Medicare annual wellness exam (BRO82-WH Z00.00).     Elevated cholesterol (HFV89-KC E78.00).     Hypertension (VGA14-CR I10).     Chronic gout (UDI67-HV M1A.9XX0).     Course:  Progressing as expected.    Plan:  Please list plan for positive findings, treatment options, risk vs. benefits. none  1.  Hypertension under reasonable control with amlodipine and lisinopril metoprolol     2.  Chronic gout no flares off diuretics     3.  Hyperlipidemia with abnormal cardiac calcium score on atorvastatin     4.  Osteoarthritis especially of her knees     5.  Nocturia improved greatly with Detrol    .     Patient Instructions:       Counseled: Patient, Discussed preventative services including  Screening for AAA (Family history or male 65-70 who has smoked more than 100 cigarettes in a lifetime.), Lipid screening, Diabetes screening, Nutrition, Bone mass, Cancer screening (cervical, breast, colon), Immunizations (flu, Pneumovax, Hep. B, Zostavax), Glaucoma screening and ECG if needed.  Appropriate weight and diet discussed.  Discussed Advance Life Directives.    Preventative services checklist reviewed, updated and copy given to patient.  Please see scanned document.       .

## 2022-02-15 NOTE — PROGRESS NOTES
Patient:   JULIEN WU            MRN: [id number]            FIN: [id number]               Age:   67 years     Sex:  Female     :  1950   Associated Diagnoses:   Encounter for Medicare annual wellness exam; Elevated cholesterol; Hypertension   Author:   Jorge Tim MD      Visit Information      Date of Service: 2017 08:16 am  Performing Location: [facility]  Encounter#: [id number]      Primary Care Provider (PCP):  Jorge Tim MD, NPI# [id number]      Referring Provider:  Jorge Tim MD# [id number]   Visit type:  Annual exam.       Chief Complaint   2017 8:22 AM CDT     AWV     Medicare Initial / Annual Preventative Visit      Well Adult History   Well Adult History             The patient presents for well adult exam.  ADL's and Safety were reviewed.  ___none___ found.  Please see copy of scanned form.    I have reviewed with the patient the completed health risk assessment and health history form and we have agreed on the following plans for identified risk factors. ___none____ found.  Please see copy of scanned form.    Also fu htn and lipids  Meds as directed no problems   .        Review of Systems   Eye:  See Preventative Services Checklist for Vision/Glaucoma Test dates..    Ear/Nose/Mouth/Throat:  Negative, Hearing is evaluated..    Respiratory:  Negative.    Cardiovascular:  Negative.    Gastrointestinal:  Negative.    Genitourinary:  Negative.    Integumentary:  Negative.    Neurologic:  Negative.    Psychiatric:  PHQ-9 Noted.    See completed Health History for Review of Systems.      Health Status   Allergies:    Allergic Reactions (Selected)  No Known Medication Allergies   Medications:  (Selected)   Prescriptions  Prescribed  Lipitor 20 mg oral tablet: 1 tab(s) ( 20 mg ), PO, Daily, # 90 tab(s), 1 Refill(s), Type: Maintenance, Pharmacy: [facility], 1 tab(s) po daily  hydroCHLOROthiazide 25 mg oral tablet: 1 tab(s) ( 25 mg ),  PO, Daily, # 90 tab(s), 1 Refill(s), Type: Maintenance, Pharmacy: [facility], 1 tab(s) po daily  lisinopril 10 mg oral tablet: 1 tab(s) ( 10 mg ), PO, Daily, # 90 tab(s), 1 Refill(s), Type: Maintenance, Pharmacy: [facility], 1 tab(s) po daily  Documented Medications  Documented  Fish Oil: po, 0 Refill(s), Type: Maintenance  Multiple Vitamins oral tablet: 1 tab(s), po, daily, 0 Refill(s), Type: Maintenance  Vitamin C: daily, 0 Refill(s), Type: Maintenance  garlic oral tablet: 0 Refill(s), Type: Maintenance   Problem list:    All Problems  Obesity / SNOMED CT 9177081904 / Probable  Degenerative arthritis of knee / SNOMED CT 928363445 / Confirmed  Elevated cholesterol / SNOMED CT 19248329 / Confirmed  Hypertension / SNOMED CT 89569722 / Confirmed     Current Providers and Suppliers Noted in Demographic Area.[email or web address]      Histories   Past Medical History:    Active  Degenerative arthritis of knee (487257244)  Comments:  10/13/2014 CDT 9:12 AM CDT - Shivani Ayala  Bilateral  Elevated cholesterol (36532737)   Family History:    No family history items have been selected or recorded.   Procedure history:    Mammogram (SNOMED CT 140242175) on 8/3/2016 at 66 Years.  Comments:  8/4/2016 7:51 PM - Leni Cantu CMA  ACR 1 Negative; recommend annual mammograms  Screening for malignant neoplasm of colon (SNOMED CT 7131919905) performed by Jorge Tim MD on 7/20/2016 at 66 Years.  Comments:  8/1/2016 6:54 PM - Estefani Jay MA result--negative  F/U:   3yrs   Social History:        Alcohol Assessment            Never      Tobacco Assessment            Never      Substance Abuse Assessment            Never      Employment and Education Assessment            Retired      Home and Environment Assessment            Marital status: .  Spouse/Partner name: Yao Trujillo.      Nutrition and Health Assessment            Type of diet: Regular.      Exercise and Physical Activity Assessment             Exercise frequency: Does not exercise.        Physical Examination   Exam at Provider Discretion   Vital Signs   7/7/2017 8:22 AM CDT Temperature Tympanic 98.7 DegF    Peripheral Pulse Rate 76 bpm    Systolic Blood Pressure 117 mmHg    Diastolic Blood Pressure 80 mmHg    Mean Arterial Pressure 92 mmHg      Measurements from flowsheet : Measurements   7/7/2017 8:22 AM CDT Height Measured - Standard 63.5 in    Weight Measured - Standard 230.2 lb    BSA 2.16 m2    Body Mass Index 40.13 kg/m2      General:  Alert and oriented, No acute distress.    Eye:  Pupils are equal, round and reactive to light, Extraocular movements are intact.    HENT:  Normocephalic, Tympanic membranes are clear, Normal hearing, Oral mucosa is moist.    Neck:  Supple, Non-tender, No carotid bruit, No jugular venous distention, No lymphadenopathy, No thyromegaly.    Respiratory:  Lungs are clear to auscultation, Respirations are non-labored, Breath sounds are equal.    Cardiovascular:  Normal rate, Regular rhythm, No murmur, Good pulses equal in all extremities, No edema.    Gastrointestinal:  Soft, Non-tender, Non-distended, Normal bowel sounds, No organomegaly.    Musculoskeletal:  Normal range of motion, Normal strength, degenerative changes noted.    Integumentary:  Warm, Dry.    Neurologic:  Alert, Oriented, Normal sensory, Normal motor function, No focal deficits, Cranial Nerves II-XII are grossly intact, Normal deep tendon reflexes, No signs of cognitive impairment..    Psychiatric:  Cooperative, Appropriate mood & affect, Normal judgment, Patient's PHQ9 and CAGE questionnaire reviewed and discussed with patient..       Review / Management   Results review:  INCLUDE PHQ 9 0.       Impression and Plan   Diagnosis     Encounter for Medicare annual wellness exam (FIY23-CO Z00.00).     Elevated cholesterol (FWM38-KJ E78.0).     Hypertension (SFV85-WP I10).     Course:  Progressing as expected.    Plan:  Please list plan for positive  findings, treatment options, risk vs. benefits. none.    Patient Instructions:       Counseled: Patient, Discussed preventative services including  Screening for AAA (Family history or male 65-70 who has smoked more than 100 cigarettes in a lifetime.), Lipid screening, Diabetes screening, Nutrition, Bone mass, Cancer screening (cervical, breast, colon), Immunizations (flu, Pneumovax, Hep. B, Zostavax), Glaucoma screening and ECG if needed.  Appropriate weight and diet discussed.  Discussed Advance Life Directives.    Preventative services checklist reviewed, updated and copy given to patient.  Please see scanned document.       .

## 2022-02-15 NOTE — NURSING NOTE
Comprehensive Intake Entered On:  9/2/2020 9:03 AM CDT    Performed On:  9/2/2020 9:01 AM CDT by Stephanie Regan               Summary   Chief Complaint :   Gout flare right great toe.  Verbal consent for telephone visit given.    Advance Directive :   Yes   Height Measured :   64 in(Converted to: 5 ft 4 in, 162.56 cm)    Stephanie Regan - 9/2/2020 9:01 AM CDT   Health Status   Allergies Verified? :   Yes   Medication History Verified? :   Yes   Tobacco Use? :   Never smoker   Stephanie Regan - 9/2/2020 9:01 AM CDT   ID Risk Screen   Recent Travel History :   No recent travel   Family Member Travel History :   No recent travel   Other Exposure to Infectious Disease :   Unknown   Stephanie Regan - 9/2/2020 9:01 AM CDT

## 2022-02-15 NOTE — LETTER
(Inserted Image. Unable to display)   January 14, 2021      JULIEN WU      [address] [place], [place] [id number]        Dear JULIEN VITALE,    Thank you for selecting [facility] for your healthcare needs.  Below you will find the results of the recent tests done at our clinic.     All labs acceptable.      Result Name Current Result Previous Result Reference Range   Potassium Level (mmol/L)  4.3 1/13/2021  4.4 10/14/2020 3.5 - 5.3   Glucose Level (mg/dL) ((H)) 104 1/13/2021 ((H)) 119 10/14/2020 65 - 99   Creatinine Level (mg/dL)  0.86 1/13/2021  0.94 10/14/2020 0.50 - 0.99       Please contact me or my assistant at [phone] if you have any questions.     Sincerely,        Jorge Tim MD        What do your labs mean?  Below is a glossary of commonly ordered labs:  LDL   Bad Cholesterol   HDL   Good Cholesterol  AST/ALT   Liver Function   Cr/Creatinine   Kidney Function  Microalbumin   Kidney Function  BUN   Kidney Function  PSA   Prostate    TSH   Thyroid Hormone  HgbA1c   Diabetes Test   Hgb (Hemoglobin)   Red Blood Cells

## 2022-02-15 NOTE — NURSING NOTE
Vital Signs Entered On:  7/12/2019 9:26 AM CDT    Performed On:  7/12/2019 9:25 AM CDT by Stephanie Regan               Vital Signs   Systolic Blood Pressure :   133 mmHg (HI)    Diastolic Blood Pressure :   82 mmHg (HI)    Mean Arterial Pressure :   99 mmHg   Peripheral Pulse Rate :   66 bpm   Stephanie Regan - 7/12/2019 9:25 AM CDT

## 2022-02-15 NOTE — PROGRESS NOTES
Patient:   JULIEN WU            MRN: [id number]            FIN: [id number]               Age:   70 years     Sex:  Female     :  1950   Associated Diagnoses:   Chronic gout; Hypertensive renal failure; Elevated cholesterol   Author:   Jorge Tim MD      Visit Information      Date of Service: 2021 09:09 am  Performing Location: [facility]  Encounter#: [id number]      Primary Care Provider (PCP):  Jorge Tim MD    NPI# [id number]      Referring Provider:  Jorge Tim MD# [id number]      Chief Complaint   2021 9:36 AM CST    Chronic Disease Visit        History of Present Illness   Patient is here for 6-month follow-up overall she is doing well blood pressures under excellent control she does get up 2 or 3 times at night to urinate which is slowly getting worse over the years she would like to see if she can help that out.  Her gout is doing much better off the diuretic.  She is tolerating the lisinopril.  .          Review of Systems   Constitutional:  Negative.    Eye:  Negative.    Ear/Nose/Mouth/Throat:  Negative.    Respiratory:  Negative.    Cardiovascular:  Negative.    Gastrointestinal:  Negative.    Genitourinary:  Negative.    Hematology/Lymphatics:  Negative.    Endocrine:  Negative.    Immunologic:  Negative.    Musculoskeletal:  Negative.    Integumentary:  Negative.    Neurologic:  Negative.       Health Status   Allergies:    Allergic Reactions (Selected)  Severity Not Documented  Amoxicillin (Rash and hand swelling)   Medications:  (Selected)   Prescriptions  Prescribed  Detrol 1 mg oral tablet: = 1 tab(s) ( 1 mg ), Oral, hs, # 90 tab(s), 3 Refill(s), Type: Maintenance, Pharmacy: [id number], 1 tab(s) Oral hs, 64, in, 21 9:48:00 CST, Height Measured, 225.8, lb, 21 9:36:00 CST, Weight Measured  amLODIPine 5 mg oral tablet: = 1 tab(s), Oral, daily, # 90 tab(s), 1 Refill(s), Type: Maintenance, Pharmacy: [facility], 1 tab(s) Oral daily, 64, in, 01/13/21 9:48:00 CST, Height Measured, 225.8, lb, 01/13/21 9:36:00 CST, Weight Measured  atorvastatin 20 mg oral tablet: = 1 tab(s) ( 20 mg ), po, daily, # 90 tab(s), 1 Refill(s), Type: Maintenance, Pharmacy: [facility], 1 tab(s) Oral daily, 64, in, 01/13/21 9:48:00 CST, Height Measured, 225.8, lb, 01/13/21 9:36:00 CST, Weight Measured  indomethacin 25 mg oral capsule: = 1 cap(s) ( 25 mg ), Oral, tid, PRN: for gout pain, # 30 cap(s), 0 Refill(s), Type: Maintenance, Pharmacy: [facility], 1 cap(s) Oral tid,PRN:for gout pain, 64, in, 10/14/20 9:04:00 CDT, Height Measured, 226, lb, 10/14/20 9:04:00 CDT, Weigh...  lisinopril 20 mg oral tablet: = 1 tab(s) ( 20 mg ), Oral, daily, # 90 tab(s), 1 Refill(s), Type: Maintenance, Pharmacy: [place] [id number], note increased dose, 1 tab(s) Oral daily, 64, in, 01/13/21 9:48:00 CST, Height Measured, 225.8, lb, 01/13/21 9:36:00 CST, Weight Measured  metoprolol succinate 25 mg oral tablet, extended release: = 1 tab(s) ( 25 mg ), Oral, daily, # 90 tab(s), 1 Refill(s), Type: Maintenance, Pharmacy: [facility], 1 tab(s) Oral daily, 64, in, 01/13/21 9:48:00 CST, Height Measured, 225.8, lb, 01/13/21 9:36:00 CST, Weight Measured  Documented Medications  Documented  Fish Oil: po, 0 Refill(s), Type: Maintenance  Multiple Vitamins oral tablet: 1 tab(s), po, daily, 0 Refill(s), Type: Maintenance  Vitamin C: daily, 0 Refill(s), Type: Maintenance  garlic oral tablet: 0 Refill(s), Type: Maintenance,    Medications          *denotes recorded medication          amLODIPine 5 mg oral tablet: 1 tab(s), Oral, daily, 90 tab(s), 1 Refill(s).          *Vitamin C: daily.          atorvastatin 20 mg oral tablet: 20 mg, 1 tab(s), po, daily, 90 tab(s), 1 Refill(s).          *garlic oral tablet: 0 Refill(s), Type: Maintenance.          indomethacin 25 mg oral capsule: 25 mg, 1 cap(s), Oral, tid, PRN: for gout pain, 30 cap(s), 0  Refill(s).          lisinopril 20 mg oral tablet: 20 mg, 1 tab(s), Oral, daily, 90 tab(s), 1 Refill(s).          metoprolol succinate 25 mg oral tablet, extended release: 25 mg, 1 tab(s), Oral, daily, 90 tab(s), 1 Refill(s).          *Multiple Vitamins oral tablet: 1 tab(s), po, daily.          *Fish Oil: po.          Detrol 1 mg oral tablet: 1 mg, 1 tab(s), Oral, hs, 90 tab(s), 3 Refill(s).       Problem list:    All Problems  Hypertension / SNOMED CT 90365404 / Confirmed  Chronic gout / SNOMED CT 020267767 / Confirmed  Elevated cholesterol / SNOMED CT 89220584 / Confirmed  Obesity / SNOMED CT 9427006166 / Probable  Degenerative arthritis of knee / SNOMED CT 594845934 / Confirmed  Seasonal allergies / SNOMED CT 961913460 / Confirmed      Histories   Past Medical History:    Active  Obesity (7938995286)  Degenerative arthritis of knee (566237854)  Comments:  10/13/2014 CDT 9:12 AM CDT - Shivani Ayala  Bilateral  Elevated cholesterol (26344042)  Hypertension (71463760)  Seasonal allergies (193702950)  Chronic gout (210796768)   Family History:    Liver cancer  Mother  Hypertension  Mother  Heart disease  Father  Parkinson's disease  Mother  Alzheimer's disease  Mother     Procedure history:    Screening for colon cancer (SNOMED CT 0057185544) performed by Jorge Tim MD on 7/25/2019 at 69 Years.  Comments:  8/5/2019 3:29 PM MARKT - Estefani Jay MA resutl:  negative  Recommendation:  f/u 3yrs  Mammogram (SNOMED CT 314109979) on 8/3/2016 at 66 Years.  Comments:  8/4/2016 7:51 PM CDT - Leni Cantu CMA  ACR 1 Negative; recommend annual mammograms  Screening for malignant neoplasm of colon (SNOMED CT 9688074334) performed by Jorge Tim MD on 7/20/2016 at 66 Years.  Comments:  8/1/2016 6:54 PM MARKT Estefani Fernandez MA result--negative  F/U:   3yrs   Social History:        Electronic Cigarette/Vaping Assessment            Electronic Cigarette Use: Never.      Alcohol Assessment:  Current            Wine (5 oz), 1-2 times per year, 1 drinks/episode average.  2 drinks/episode maximum.  Ready to change: No.                     Comments:                      07/07/2017 - Shivani Ayala                     Occasionally      Tobacco Assessment            Never (less than 100 in lifetime)      Substance Abuse Assessment: Denies Substance Abuse            Never      Employment and Education Assessment            Retired      Home and Environment Assessment            Marital status: .  Spouse/Partner name: Yao Au.  Living situation: Home/Independent.               Injuries/Abuse/Neglect in household: No.  Feels unsafe at home: No.  Family/Friends available for support:               Yes.  Risks in environment: Stairs.      Nutrition and Health Assessment            Type of diet: Regular.  Wants to lose weight: Yes.  Sleeping concerns: No.  Feels highly stressed: No.      Exercise and Physical Activity Assessment: Does not exercise            Exercise frequency: Never.      Sexual Assessment            Sexually active: Yes.  Identifies as female, Sexual orientation: Straight or heterosexual.  History of STD:               No.  History of sexual abuse: No.        Physical Examination   Vital Signs   1/13/2021 9:48 AM CST Systolic Blood Pressure 117 mmHg    Diastolic Blood Pressure 71 mmHg    Mean Arterial Pressure 86 mmHg   1/13/2021 9:36 AM CST Temperature Tympanic 98.7 DegF    Peripheral Pulse Rate 80 bpm    HR Method Electronic    Systolic Blood Pressure 145 mmHg  HI    Diastolic Blood Pressure 79 mmHg    Mean Arterial Pressure 101 mmHg    BP Method Electronic    Vital Signs Comments thigh cuff used      Measurements from flowsheet : Measurements   1/13/2021 9:48 AM CST Height Measured - Standard 64 in   1/13/2021 9:36 AM CST Height Measured - Standard 64 in    Weight Measured - Standard 225.8 lb    BSA 2.15 m2    Body Mass Index 38.75 kg/m2  HI      General:  Alert and oriented, No  acute distress.    Eye:  Pupils are equal, round and reactive to light, Extraocular movements are intact.    HENT:  Normocephalic.    Neck:  Supple, Non-tender, No carotid bruit.    Respiratory:  Lungs are clear to auscultation, Respirations are non-labored.    Cardiovascular:  Normal rate, Regular rhythm, No edema.    Gastrointestinal:  Soft, Non-tender.    Integumentary:  No rash.    Neurologic:  Alert, Oriented, Cranial Nerves II-XII are grossly intact.       Health Maintenance      Recommendations     Pending (in the next year)        OverDue           Type 2 Diabetes Mellitus Screen (Female) due  11/05/15  and every 1  year(s)           Osteoporosis Screen due  08/14/20  and every 2  year(s)        Due            Hepatitis C Screen [id number] (Female) due  01/13/21  One-time only           Zoster/Shingles Vaccine due  01/13/21  One-time only        Due In Future            Alcohol Misuse Screen (Female) not due until  07/23/21  and every 1  year(s)           Lipid Disorders Screen (Female) not due until  07/23/21  and every 1  year(s)           Depression Screen (Female) not due until  07/23/21  and every 1  year(s)           Fall Risk Screen (Female) not due until  07/23/21  and every 1  year(s)           Breast Cancer Screen not due until  08/15/21  and every 2  year(s)           Influenza Vaccine not due until  09/01/21  and every 1  year(s)     Satisfied (in the past 1 year)        Satisfied            Alcohol Misuse Screen (Female) on  07/23/20.           Alcohol Misuse Screen (Female) on  07/23/20.           Body Mass Index Check (Female) on  01/13/21.           Body Mass Index Check (Female) on  11/04/20.           Body Mass Index Check (Female) on  10/14/20.           Body Mass Index Check (Female) on  09/23/20.           Body Mass Index Check (Female) on  07/23/20.           Body Mass Index Check (Female) on  07/10/20.           Body Mass Index Check (Female) on  01/15/20.           Depression Screen  (Female) on  07/23/20.           Depression Screen (Female) on  07/23/20.           Fall Risk Screen (Female) on  07/23/20.           High Blood Pressure Screen (Female) on  01/13/21.           High Blood Pressure Screen (Female) on  01/13/21.           High Blood Pressure Screen (Female) on  11/04/20.           High Blood Pressure Screen (Female) on  10/14/20.           High Blood Pressure Screen (Female) on  09/23/20.           High Blood Pressure Screen (Female) on  09/16/20.           High Blood Pressure Screen (Female) on  07/23/20.           High Blood Pressure Screen (Female) on  07/10/20.           High Blood Pressure Screen (Female) on  01/15/20.           Influenza Vaccine on  09/23/20.           Lipid Disorders Screen (Female) on  07/23/20.           Lipid Disorders Screen (Female) on  07/23/20.           Lipid Disorders Screen (Female) on  07/23/20.           Lipid Disorders Screen (Female) on  07/23/20.           Obesity Screen and Counseling (Female) on  01/13/21.           Obesity Screen and Counseling (Female) on  11/04/20.           Obesity Screen and Counseling (Female) on  10/14/20.           Obesity Screen and Counseling (Female) on  09/23/20.           Obesity Screen and Counseling (Female) on  07/23/20.           Obesity Screen and Counseling (Female) on  07/10/20.           Obesity Screen and Counseling (Female) on  01/15/20.           Tobacco Use Screen (Female) on  01/13/21.           Tobacco Use Screen (Female) on  11/04/20.           Tobacco Use Screen (Female) on  10/14/20.           Tobacco Use Screen (Female) on  09/23/20.           Tobacco Use Screen (Female) on  09/02/20.           Tobacco Use Screen (Female) on  07/23/20.           Tobacco Use Screen (Female) on  07/10/20.           Tobacco Use Screen (Female) on  01/15/20.        Canceled            Lung Cancer Screen (Female) on  07/23/20.          Impression and Plan   Diagnosis     Chronic gout (XMX98-LR [id number].[id number]).      Hypertensive renal failure (JWP54-QA I12.9).     Elevated cholesterol (FWM61-XL E78.00).     Course:  Progressing as expected.    Plan:  Problem #1 hypertension with renal failure doing well at this point with control we will recheck labs continue with lisinopril and amlodipine as well as metoprolol  2.  Increased nocturia we will start her on Detrol at bedtime #3 hyperlipidemia under good control with atorvastatin No. 4 gout no recent gouty flares  .   .    Patient Instructions:       Counseled: Patient, Regarding diagnosis, Regarding treatment, Regarding medications.

## 2022-02-15 NOTE — TELEPHONE ENCOUNTER
---------------------  From: Henry Daugherty   Sent: 6/19/2020 9:37:48 AM CDT  Subject: Rx refill request      Med Refill      Date of last office visit and reason:  01/05/2020; HTN                                   Date of last Med Check / Px:   01/05/2020  Date of last labs pertaining to med:  07/12/2019    RTC order in chart:  Yes, due July.     For Protocol refill, has patient been contacted:  No, pt has an appt scheduled 07/23. Called and requested refill as she will be running out around 07/15. 30 day supply sent to pharmacy.

## 2022-02-15 NOTE — LETTER
(Inserted Image. Unable to display)           August 13, 2021          JULIEN WU  [address] [place] [address]         To whom it may concern,              Your bone density results indicate:  _   Normal bone density  _x   Osteopenia in hip/spine (mild bone thinning   not osteoporosis)  _   Osteoporosis in hip/spine  Our recommendation is:  _   Schedule an appointment with your health care provider to discuss your results.     _x   Continue current regimen    _x   Calcium    Recommended daily amounts for men and women are:  o Men age 50 - 69  1000 mg  o Men age 70+  1200 mg  o Women age 50+  1200 mg  Vitamin D    800 - 1000 IU daily  Weight bearing Exercise    30 minutes or more several times a week  Avoid excess alcohol and smoking  Prevent falling    Avoid excessive sedation or hypotension (low blood pressure)    Improve strength    Decrease or remove environmental hazards    x_   Repeat bone density in _2 year(s)            Please contact me or my assistant at [phone] if you have any questions or concerns.     Sincerely,        Jorge Tim MD

## 2022-02-15 NOTE — PROGRESS NOTES
Chief Complaint    Gout flare right great toe.  Verbal consent for telephone visit given.  History of Present Illness      Today's visit was conducted via telephone visit due to the COVID-19 pandemic. PT consent to telephone visit was obtained and documented       Call Start Time:  9:12 am      Call End Time:   9:20 am      Pt at home in [facility].       Pt reports 2-3 day onset of redness, swelling and pain of the R great toe.  Had similar about 6 weeks ago and resolved with use of prednisone.  This is her second episode.  She is on HCTZ.  She also has had recent decline in her renal function which is being monitored closely.       No fevers, no other joint aches or pains.  She denies need for gait aid at this time.       She is otherwise feeling well.  She has FU BMP in about 1 month.   Review of Systems      Review of systems is negative with the exception of those noted in HPI   Physical Exam   Vitals & Measurements    HT: 64 in   Assessment/Plan       1. Gout flare (M10.9)         Discussed etiology and typical tx of gout.  Given impaired renal function will use prednisone for this flare.  Discussed with pt rest, elevation, fluids, use of gait aid and hard sole shoe for comfort.         I discussed she would likely benefit from stopping the HCTZ but she prefers to have Dr. Tim manage her BP medication management so prefers to fu with him at a later visit to discuss that as he is not available today.  Will have her do a uric acid level when she returns for her nonfasting labs in about 3-4 weeks as well.        Orders:         predniSONE, See Instructions, Instructions: 4 po qd x 3 days, 3 po qd x 3 days, 2 po qd x 3 days, 1 po qd x 3 days, # 30 tab(s), 0 Refill(s), Type: Acute, Pharmacy: [id number], 4 po qd x 3 days, 3 po qd x 3 days, 2 po qd x 3 days, 1 po qd x 3 days, 64,..., (Ordered)         Return to Clinic (Request), RFV: discuss gout/BP medication with Dr. Tim at next visit in  1 month Non-fasting labs prior : uric acid Dx: gout, Return in 1 month  Patient Information     Name:JULIEN WU      Address:      [address] [place] [id number]     Sex:Female     YOB: 1950     [phone]     Emergency Contact:CAROLE WU     MRN:[id number]     FIN:[id number]     Location:[facility]     Date of Service:09/02/2020      Primary Care Physician:       Jorge Tim MD, [phone]      Attending Physician:       Shaylee Hayes PA-C, [phone]  Problem List/Past Medical History    Ongoing     Chronic gout     Degenerative arthritis of knee       Comments: Bilateral     Elevated cholesterol     Hypertension     Obesity     Seasonal allergies    Historical     No qualifying data  Procedure/Surgical History     Screening for colon cancer (07/25/2019)      Comments: Cologuard resutl:  negative      Recommendation:  f/u 3yrs.     Mammogram (08/03/2016)      Comments: ACR 1 Negative; recommend annual mammograms.     Screening for malignant neoplasm of colon (07/20/2016)      Comments: Cologuard result--negative      F/U:   3yrs.  Medications    atorvastatin 20 mg oral tablet, 20 mg= 1 tab(s), Oral, daily, 1 refills    Fish Oil, Oral    garlic oral tablet    hydroCHLOROthiazide 25 mg oral tablet, 25 mg= 1 tab(s), Oral, daily, 1 refills    lisinopril 10 mg oral tablet, 10 mg= 1 tab(s), Oral, daily, 1 refills    Multiple Vitamins oral tablet, 1 tab(s), Oral, daily    predniSONE 10 mg oral tablet, See Instructions    Vitamin C, daily  Allergies    amoxicillin (hand swelling, rash)  Social History    Smoking Status     Never smoker     Alcohol - Current      Wine (5 oz), 1-2 times per year, 1 drinks/episode average. 2 drinks/episode maximum. Ready to change: No.     Employment/School      Retired     Exercise - Does not exercise      Exercise frequency: Never.     Home/Environment      Marital status: . Spouse/Partner name:  Yao Au. Living situation: Home/Independent. Injuries/Abuse/Neglect in household: No. Feels unsafe at home: No. Family/Friends available for support: Yes. Risks in environment: Stairs.     Nutrition/Health      Type of diet: Regular. Wants to lose weight: Yes. Sleeping concerns: No. Feels highly stressed: No.     Sexual      Sexually active: Yes. Identifies as female, Sexual orientation: Straight or heterosexual. History of STD: No. History of sexual abuse: No.     Substance Abuse - Denies Substance Abuse      Never     Tobacco - Denies Tobacco Use      Never (less than 100 in lifetime)  Family History    Alzheimer's disease: Mother.    Heart disease: Father.    Hypertension: Mother.    Liver cancer: Mother.    Parkinson's disease: Mother.  Immunizations      Vaccine Date Status          influenza virus vaccine, inactivated 10/11/2019 Given          influenza virus vaccine, inactivated 10/02/2018 Given          influenza virus vaccine, inactivated 10/18/2017 Given          pneumococcal (PPSV23) 07/07/2017 Given          influenza virus vaccine, inactivated 09/20/2016 Given          pneumococcal (PCV13) 07/06/2016 Given          influenza virus vaccine, inactivated 10/13/2015 Given          tetanus/diphth/pertuss (Tdap) adult/adol 12/03/2014 Given          influenza virus vaccine, inactivated 10/10/2014 Given  Lab Results       Lab Results (Last 4 results within 90 days)        Sodium Level: 137 mmol/L [135 mmol/L - 146 mmol/L] (07/23/20 09:08:00)       Potassium Level: 4.4 mmol/L [3.5 mmol/L - 5.3 mmol/L] (07/23/20 09:08:00)       Chloride Level: 98 mmol/L [98 mmol/L - 110 mmol/L] (07/23/20 09:08:00)       CO2 Level: 31 mmol/L [20 mmol/L - 32 mmol/L] (07/23/20 09:08:00)       Glucose Level: 118 mg/dL High [65 mg/dL - 99 mg/dL] (07/23/20 09:08:00)       BUN: 34 mg/dL High [7 mg/dL - 25 mg/dL] (07/23/20 09:08:00)       Creatinine Level: 1.23 mg/dL High [0.5 mg/dL - 0.99 mg/dL] (07/23/20 09:08:00)       BUN/Creat  Ratio: 28 High [6  - 22] (07/23/20 09:08:00)       eGFR: 45 mL/min/1.73m2 Low (07/23/20 09:08:00)       eGFR : 52 mL/min/1.73m2 Low (07/23/20 09:08:00)       Calcium Level: 9.9 mg/dL [8.6 mg/dL - 10.4 mg/dL] (07/23/20 09:08:00)       Cholesterol: 191 mg/dL (07/23/20 09:08:00)       Non-HDL Cholesterol: 135 High (07/23/20 09:08:00)       HDL: 56 mg/dL (07/23/20 09:08:00)       Cholesterol/HDL Ratio: 3.4 (07/23/20 09:08:00)       LDL: 105 High (07/23/20 09:08:00)       Triglyceride: 187 mg/dL High (07/23/20 09:08:00)

## 2022-02-15 NOTE — TELEPHONE ENCOUNTER
---------------------  From: Stephanie Regan   To: [employer] Message Pool ([id number] [facility] [place]);     Sent: 9/17/2020 12:25:27 PM CDT  Subject: Result: Labs     Left message for patient to call back at: 12:25pm          ---------------------  From: Jorge Tim MD   To: Stephanie Regan;     Sent: 9/17/2020 7:47:42 AM CDT ! Show up: 9/17/2020 7:47:42 AM CDT  Subject: Results Follow Up   Actions: Notify the patient for appointment    Due Date/Time: 9/17/2020 8:16:00 AM CDT               Reminder Comments:  let her know kidney function is decreasing and uric acid(gout blood test) high Likely from HCTZ Stop HCTZ start amlodipine 5 mg daily dsp 90 See me in 2 weeks    Results:  Date Result Name Ind Value Ref Range   9/16/2020 8:33 AM Sodium Level ((L)) 133 mmol/L (135 - 146)   9/16/2020 8:33 AM Potassium Level  4.6 mmol/L (3.5 - 5.3)   9/16/2020 8:33 AM Creatinine Level ((H)) 1.20 mg/dL (0.50 - 0.99)   9/16/2020 8:33 AM eGFR ((L)) 46 mL/min/1.73m2 (> OR = 60 - )   9/16/2020 8:33 AM Uric Acid ((H)) 8.5 mg/dL (2.5 - 7.0)Patient returning call. Patient notified and new med sent to pharm. Patient has appointment scheduled for next week.

## 2022-02-15 NOTE — NURSING NOTE
Comprehensive Intake Entered On:  10/4/2019 1:05 PM CDT    Performed On:  10/4/2019 1:03 PM CDT by Stephanie Regan               Summary   Chief Complaint :   cough, congestion x1 week.  No fever   Advance Directive :   Yes   Weight Measured :   223.4 lb(Converted to: 223 lb 6 oz, 101.33 kg)    Height Measured :   64 in(Converted to: 5 ft 4 in, 162.56 cm)    Body Mass Index :   38.34 kg/m2 (HI)    Body Surface Area :   2.14 m2   Systolic Blood Pressure :   145 mmHg (HI)    Diastolic Blood Pressure :   97 mmHg (HI)    Mean Arterial Pressure :   113 mmHg   Peripheral Pulse Rate :   102 bpm (HI)    BP Method :   Electronic   HR Method :   Electronic   Temperature Tympanic :   98.7 DegF(Converted to: 37.1 DegC)    Oxygen Saturation :   97 %   Stephanie Regan - 10/4/2019 1:03 PM CDT   Health Status   Allergies Verified? :   Yes   Medication History Verified? :   Yes   Tobacco Use? :   Never smoker   Stephanie Regan - 10/4/2019 1:03 PM CDT

## 2022-02-15 NOTE — NURSING NOTE
Quick Intake Entered On:  1/13/2021 9:48 AM CST    Performed On:  1/13/2021 9:48 AM CST by Jorge Tim MD               Summary   Advance Directive :   Yes   Height Measured :   64 in(Converted to: 5 ft 4 in, 162.56 cm)    Systolic Blood Pressure :   117 mmHg   Diastolic Blood Pressure :   71 mmHg   Mean Arterial Pressure :   86 mmHg   Jorge Tim MD - 1/13/2021 9:48 AM CST

## 2022-02-15 NOTE — PROGRESS NOTES
Patient:   JULIEN WU            MRN: [id number]            FIN: [id number]               Age:   69 years     Sex:  Female     :  1950   Associated Diagnoses:   Encounter for Medicare annual wellness exam; Elevated cholesterol; Hypertension   Author:   Jorge Tim MD      Visit Information      Date of Service: 2019 09:13 am  Performing Location: [facility]  Encounter#: [id number]      Primary Care Provider (PCP):  Jorge Tim MD, NPI# [id number]      Referring Provider:  Jorge Tim MD# [id number]   Visit type:  Annual exam.       Chief Complaint   2019 9:13 AM CDT    AWV     Medicare Initial / Annual Preventative Visit      Well Adult History   Well Adult History             The patient presents for well adult exam.  ADL's and Safety were reviewed.  ___none___ found.  Please see copy of scanned form.    I have reviewed with the patient the completed health risk assessment and health history form and we have agreed on the following plans for identified risk factors. ___none____ found.  Please see copy of scanned form.    Also fu htn and lipids  Meds as directed no problems   .        Review of Systems   Eye:  See Preventative Services Checklist for Vision/Glaucoma Test dates..    Ear/Nose/Mouth/Throat:  Negative, Hearing is evaluated..    Respiratory:  Negative.    Cardiovascular:  Negative.    Gastrointestinal:  Negative.    Genitourinary:  Negative.    Integumentary:  Negative.    Neurologic:  Negative.    Psychiatric:  PHQ-9 Noted.    See completed Health History for Review of Systems.      Health Status   Allergies:    Allergic Reactions (Selected)  Severity Not Documented  Amoxicillin (Rash and hand swelling)   Medications:  (Selected)   Prescriptions  Prescribed  atorvastatin 20 mg oral tablet: = 1 tab(s) ( 20 mg ), po, daily, # 90 tab(s), 1 Refill(s), Type: Maintenance, Pharmacy: [id number], 1 tab(s) Oral  daily  hydroCHLOROthiazide 25 mg oral tablet: = 1 tab(s) ( 25 mg ), po, daily, # 90 tab(s), 1 Refill(s), Type: Maintenance, Pharmacy: [facility], 1 tab(s) Oral daily  lisinopril 10 mg oral tablet: = 1 tab(s) ( 10 mg ), po, daily, # 90 tab(s), 1 Refill(s), Type: Maintenance, Pharmacy: [facility], 1 tab(s) Oral daily  Documented Medications  Documented  Fish Oil: po, 0 Refill(s), Type: Maintenance  Multiple Vitamins oral tablet: 1 tab(s), po, daily, 0 Refill(s), Type: Maintenance  Vitamin C: daily, 0 Refill(s), Type: Maintenance  garlic oral tablet: 0 Refill(s), Type: Maintenance,    Medications          *denotes recorded medication          *Vitamin C: daily.          atorvastatin 20 mg oral tablet: 20 mg, 1 tab(s), po, daily, 90 tab(s), 1 Refill(s).          *garlic oral tablet: 0 Refill(s), Type: Maintenance.          hydroCHLOROthiazide 25 mg oral tablet: 25 mg, 1 tab(s), po, daily, 90 tab(s), 1 Refill(s).          lisinopril 10 mg oral tablet: 10 mg, 1 tab(s), po, daily, 90 tab(s), 1 Refill(s).          *Multiple Vitamins oral tablet: 1 tab(s), po, daily.          *Fish Oil: po.     Problem list:    All Problems  Hypertension / SNOMED CT 38122593 / Confirmed  Elevated cholesterol / SNOMED CT 77033461 / Confirmed  Obesity / SNOMED CT 2639480698 / Probable  Degenerative arthritis of knee / SNOMED CT 437573244 / Confirmed  Seasonal allergies / SNOMED CT 858950156 / Confirmed      Histories   Past Medical History:    Active  Degenerative arthritis of knee (691650545)  Comments:  10/13/2014 CDT 9:12 AM CDT - Shivani Ayala  Bilateral  Elevated cholesterol (89411143)  Seasonal allergies (592362951)   Family History:    No family history items have been selected or recorded.   Procedure history:    Mammogram (SNOMED CT 490635765) on 8/3/2016 at 66 Years.  Comments:  8/4/2016 7:51 PM Leni Jarrett CMA  ACR 1 Negative; recommend annual mammograms  [place] [place] performed by Jorge Tim MD on 7/20/2016 at 66 Years.  Comments:  8/1/2016 6:54 PM CDT - Misty GOTTLIEB, Estefani Lock result--negative  F/U:   3yrs   Social History:        Alcohol Assessment: Current            Current                     Comments:                      07/07/2017 - Shivani Ayala                     Occasionally      Tobacco Assessment: Denies Tobacco Use            Never      Substance Abuse Assessment: Denies Substance Abuse            Never      Employment and Education Assessment            Retired      Home and Environment Assessment            Marital status: .  Spouse/Partner name: Yao Trujillo.      Nutrition and Health Assessment            Type of diet: Regular.      Exercise and Physical Activity Assessment: Does not exercise            Exercise frequency: Does not exercise.      Physical Examination   Exam at Provider Discretion   Vital Signs   7/12/2019 9:25 AM CDT Peripheral Pulse Rate 66 bpm    Systolic Blood Pressure 133 mmHg  HI    Diastolic Blood Pressure 82 mmHg  HI    Mean Arterial Pressure 99 mmHg   7/12/2019 9:13 AM CDT Temperature Tympanic 97.6 DegF  LOW    Peripheral Pulse Rate 76 bpm    HR Method Electronic    Systolic Blood Pressure 137 mmHg  HI    Diastolic Blood Pressure 85 mmHg  HI    Mean Arterial Pressure 102 mmHg    BP Method Electronic      Measurements from flowsheet : Measurements   7/12/2019 9:13 AM CDT Height Measured - Standard 64 in    Weight Measured - Standard 221.8 lb    BSA 2.13 m2    Body Mass Index 38.07 kg/m2  HI      General:  Alert and oriented, No acute distress.    Eye:  Pupils are equal, round and reactive to light, Extraocular movements are intact.    HENT:  Normocephalic, Tympanic membranes are clear, Normal hearing, Oral mucosa is moist.    Neck:  Supple, Non-tender, No carotid bruit, No jugular venous distention, No lymphadenopathy, No thyromegaly.    Respiratory:  Lungs are clear to auscultation, Respirations  are non-labored, Breath sounds are equal.    Cardiovascular:  Normal rate, Regular rhythm, No murmur, Good pulses equal in all extremities, No edema.    Gastrointestinal:  Soft, Non-tender, Non-distended, Normal bowel sounds, No organomegaly.    Musculoskeletal:  Normal range of motion, Normal strength, degenerative changes noted.    Integumentary:  Warm, Dry.    Neurologic:  Alert, Oriented, Normal sensory, Normal motor function, No focal deficits, Cranial Nerves II-XII are grossly intact, Normal deep tendon reflexes, No signs of cognitive impairment..    Psychiatric:  Cooperative, Appropriate mood & affect, Normal judgment, Patient's PHQ9 and CAGE questionnaire reviewed and discussed with patient..       Review / Management   Results review:  INCLUDE PHQ 9 0.       Impression and Plan   Diagnosis     Encounter for Medicare annual wellness exam (YMM71-OZ Z00.00).     Elevated cholesterol (UBU35-IJ E78.00).     Hypertension (HWJ19-HG I10).     Course:  Progressing as expected.    Plan:  Please list plan for positive findings, treatment options, risk vs. benefits. none  Advance Dir discussed.    Patient Instructions:       Counseled: Patient, Discussed preventative services including  Screening for AAA (Family history or male 65-70 who has smoked more than 100 cigarettes in a lifetime.), Lipid screening, Diabetes screening, Nutrition, Bone mass, Cancer screening (cervical, breast, colon), Immunizations (flu, Pneumovax, Hep. B, Zostavax), Glaucoma screening and ECG if needed.  Appropriate weight and diet discussed.  Discussed Advance Life Directives.    Preventative services checklist reviewed, updated and copy given to patient.  Please see scanned document.       .

## 2022-02-15 NOTE — NURSING NOTE
Hearing and Vision Screening Entered On:  7/12/2019 9:24 AM CDT    Performed On:  7/12/2019 9:23 AM CDT by Stephanie Regan               Hearing and Vision Screening   Audiogram Result Right Ear :   Fail   Audiogram Result Left Ear :   Fail   Hearing Screen Comments :   Will schedule hearing evaluation   Stephanie Regan - 7/12/2019 9:23 AM CDT

## 2022-02-15 NOTE — NURSING NOTE
Medicare Visit Entered On:  7/23/2020 8:42 AM CDT    Performed On:  7/23/2020 8:39 AM CDT by Stephanie Regan               Summary   Chief Complaint :   AWV   Advance Directive :   Yes   Weight Measured :   220.1 lb(Converted to: 220 lb 2 oz, 99.84 kg)    Height Measured :   64 in(Converted to: 5 ft 4 in, 162.56 cm)    Body Mass Index :   37.78 kg/m2 (HI)    Body Surface Area :   2.12 m2   Systolic Blood Pressure :   135 mmHg (HI)    Diastolic Blood Pressure :   81 mmHg (HI)    Mean Arterial Pressure :   99 mmHg   Peripheral Pulse Rate :   84 bpm   BP Method :   Electronic   HR Method :   Electronic   Temperature Tympanic :   97.8 DegF(Converted to: 36.6 DegC)  (LOW)    Stephanie Regan - 7/23/2020 8:39 AM CDT   Health Status   Allergies Verified? :   Yes   Medication History Verified? :   Yes   Pre-Visit Planning Status :   Completed   Tobacco Use? :   Never smoker   Stephanie Regan - 7/23/2020 8:39 AM CDT   Geriatric Depression Screening   Geriatric Depression Satisfied Life :   Yes   Geriatric Depression Dropped Activities :   No   Geriatric Depression Life Empty :   No   Geriatric Depression Bored :   No   Geriatric Depression Good Spirits :   Yes   Geriatric Depression Afraid Bad Things :   No   Geriatric Depression Feel Happy :   Yes   Geriatric Depression Feel Helpless :   No   Geriatric Depression Prefer to Stay Home :   Yes   Geriatric Depression Memory Problems :   No   Geriatric Depression Wonderful Be Alive :   Yes   Geriatric Depression Feel Worthless :   No   Geriatric Depression Situation Hopeless :   No   Geriatric Depression Others Better Off :   No   Geriatric Depression Full of Energy :   Yes   Geriatric Depression Total Score :   1    Stephanie Regan - 7/23/2020 8:39 AM CDT   Home Safety Screen   Emergency Numbers Kept/Updated :   Yes   Aware of Smoking Dangers :   Yes   Smoke Alarms/Fire Extinguisher Available :   Yes   Household Members Fire Safety Knowledge :   Yes   Firearms Unloaded  and Secure :   Yes   Floor Rugs Removed or Fastened :   Yes   Mats in Bathtub/Shower :   Yes   Stairway Rails or Banisters :   Yes   Outdoor Clutter Safety :   Yes   Indoor Clutter Safety :   Yes   Electrical Cord Safety :   Yes   Stephanie Regan - 7/23/2020 8:39 AM CDT   Advance Directives   Advance Directive :   Yes   Advanced Directives Status :   Current and verified   Advance Directive Type :   Medical durable power of    Medical Power of  Name :   Yao Au   Advance Directive Intent Stated By :   Self   Advance Directive Additional Information :   No   Stephanie Regan - 7/23/2020 8:39 AM CDT   Functional Assessment   Focused Functional Assessment Grid   Bathing :   Independent (2)   Dressing :   Independent (2)   Toileting :   Independent (2)   Transferring Bed or Chair :   Independent (2)   Continence :   Independent (2)   Feeding :   Independent (2)   Stephanie Regan - 7/23/2020 8:39 AM CDT   ADL Index Score :   12    Capable of Shopping :   Yes   Capable of Walking :   Yes   Capable of Housekeeping :   Yes   Capable of Managing Medications :   Yes   Capable of Handling Finances :   Yes   Stephanie Regan - 7/23/2020 8:39 AM CDT

## 2022-02-15 NOTE — LETTER
(Inserted Image. Unable to display)   August 13, 2021      JULIEN WU  [address] [place] [id number]        Dear JULIEN VITALE,      Thank you for selecting [facility] for your healthcare needs.     This letter is to inform you that we have received a copy of your mammogram results.  Your results were normal unless noted below.  You will also receive notice of your results from the radiologist within 7-10 days.  This letter is to let you know I have also received a copy and it is filed in your clinic chart.      Please plan on having your next mammogram done in 12 months.          Please contact me or my assistant at [phone] if you have any questions or concerns.     Sincerely,

## 2022-02-15 NOTE — NURSING NOTE
Comprehensive Intake Entered On:  7/10/2020 2:31 PM CDT    Performed On:  7/10/2020 2:27 PM CDT by Umu Miller CMA               Summary   Chief Complaint :   c/o big right toe pain present since Wednesday .    Advance Directive :   Yes   Weight Measured :   217.2 lb(Converted to: 217 lb 3 oz, 98.52 kg)    Height Measured :   64 in(Converted to: 5 ft 4 in, 162.56 cm)    Body Mass Index :   37.28 kg/m2 (HI)    Body Surface Area :   2.11 m2   Systolic Blood Pressure :   159 mmHg (HI)    Diastolic Blood Pressure :   89 mmHg (HI)    Mean Arterial Pressure :   112 mmHg   Peripheral Pulse Rate :   121 bpm (HI)    BP Site :   Right arm   BP Method :   Electronic   HR Method :   Electronic   Temperature Tympanic :   100.0 DegF(Converted to: 37.8 DegC)    Umu Miller CMA - 7/10/2020 2:27 PM CDT   Health Status   Allergies Verified? :   Yes   Medication History Verified? :   Yes   Pre-Visit Planning Status :   Completed   Tobacco Use? :   Never smoker   Umu Miller CMA - 7/10/2020 2:27 PM CDT   Consents   Consent for Immunization Exchange :   Consent Granted   Consent for Immunizations to Providers :   Consent Granted   Umu Miller CMA - 7/10/2020 2:27 PM CDT   Meds / Allergies   (As Of: 7/10/2020 2:31:15 PM CDT)   Allergies (Active)   amoxicillin  Estimated Onset Date:   Unspecified ; Reactions:   rash, hand swelling ; Created By:   Marry Montalvo RN; Reaction Status:   Active ; Category:   Drug ; Substance:   amoxicillin ; Type:   Allergy ; Updated By:   Marry Montalvo RN; Reviewed Date:   7/11/2018 9:10 AM CDT        Medication List   (As Of: 7/10/2020 2:31:15 PM CDT)   Prescription/Discharge Order    atorvastatin  :   atorvastatin ; Status:   Prescribed ; Ordered As Mnemonic:   atorvastatin 20 mg oral tablet ; Simple Display Line:   20 mg, 1 tab(s), po, daily, 30 tab(s), 0 Refill(s) ; Ordering Provider:   Jorge Tim MD; Catalog Code:   atorvastatin ; Order Dt/Tm:   6/19/2020 9:34:58 AM CDT           hydroCHLOROthiazide  :   hydroCHLOROthiazide ; Status:   Prescribed ; Ordered As Mnemonic:   hydroCHLOROthiazide 25 mg oral tablet ; Simple Display Line:   25 mg, 1 tab(s), po, daily, 30 tab(s), 0 Refill(s) ; Ordering Provider:   Jorge Tim MD; Catalog Code:   hydroCHLOROthiazide ; Order Dt/Tm:   6/19/2020 9:34:59 AM CDT          lisinopril  :   lisinopril ; Status:   Prescribed ; Ordered As Mnemonic:   lisinopril 10 mg oral tablet ; Simple Display Line:   10 mg, 1 tab(s), po, daily, 30 tab(s), 0 Refill(s) ; Ordering Provider:   Jorge Tim MD; Catalog Code:   lisinopril ; Order Dt/Tm:   6/19/2020 9:35:00 AM CDT            Home Meds    ascorbic acid  :   ascorbic acid ; Status:   Documented ; Ordered As Mnemonic:   Vitamin C ; Simple Display Line:   daily ; Catalog Code:   ascorbic acid ; Order Dt/Tm:   10/10/2014 8:57:56 AM CDT          garlic  :   garlic ; Status:   Documented ; Ordered As Mnemonic:   garlic oral tablet ; Catalog Code:   garlic ; Order Dt/Tm:   10/27/2014 8:57:13 AM CDT          multivitamin  :   multivitamin ; Status:   Documented ; Ordered As Mnemonic:   Multiple Vitamins oral tablet ; Simple Display Line:   1 tab(s), po, daily ; Catalog Code:   multivitamin ; Order Dt/Tm:   10/10/2014 8:57:49 AM CDT          omega-3 polyunsaturated fatty acids  :   omega-3 polyunsaturated fatty acids ; Status:   Documented ; Ordered As Mnemonic:   Fish Oil ; Simple Display Line:   po ; Catalog Code:   omega-3 polyunsaturated fatty acids ; Order Dt/Tm:   10/27/2014 8:57:25 AM CDT            ID Risk Screen   Recent Travel History :   No recent travel   Family Member Travel History :   No recent travel   Other Exposure to Infectious Disease :   Unknown   Umu Miller CMA - 7/10/2020 2:27 PM CDT

## 2022-02-15 NOTE — LETTER
(Inserted Image. Unable to display)   August 13, 2019      JULIEN WU  [address], [place] [id number]        Dear JULIEN VITALE,      Thank you for selecting [facility] for your healthcare needs.     The results of the cologuard stool test was received and your result was negative.      A negative result indicates a lower likelihood that colorectal cancer or advanced adenoma (pre-cancer) is present.  Periodic Colorectal Cancer Screenings are recommended in the future approximately every 3 years.            Please contact me or my assistant at [phone] if you have any questions or concerns.     Sincerely,        Jorge Tim MD

## 2022-02-15 NOTE — PROGRESS NOTES
Patient:   JULIEN WU            MRN: [id number]            FIN: [id number]               Age:   69 years     Sex:  Female     :  1950   Associated Diagnoses:   Bacterial pneumonia   Author:   Jorge Tim MD      Visit Information      Primary Care Provider (PCP):  Jorge Tim MD    NPI# [id number]      Chief Complaint   10/4/2019 1:03 PM CDT    cough, congestion x1 week.  No fever     Upper Respiratory Symptoms      History of Present Illness             The patient presents with symptoms of an upper respiratory infection.  The symptoms of the upper respiratory infection are described as rhinorrhea, nasal congestion, cough and no sputum production.  The severity of the symptoms associated to the upper respiratory infection is moderate.  The timing/course of upper respiratory infection symptoms fluctuates in intensity.  The symptoms of upper respiratory infection have lasted for 1 week(s).  Exacerbating factors consist of none.  Relieving factors consist of none.  Associated symptoms consist of none.        Review of Systems   Constitutional:  Fatigue.    Eye:  Negative.    Ear/Nose/Mouth/Throat:  Nasal congestion.    Respiratory:  Cough, No shortness of breath, No sputum production, No wheezing.    Cardiovascular:  Negative.    Gastrointestinal:  Negative.    Genitourinary:  Negative.    Hematology/Lymphatics:  Negative.    Endocrine:  Negative.    Immunologic:  Negative.    Musculoskeletal:  Negative.    Integumentary:  Negative, No rash.    Neurologic:  Negative.    Psychiatric:  Negative.    All other systems reviewed and negative      Health Status   Allergies:    Allergic Reactions (Selected)  Severity Not Documented  Amoxicillin (Rash and hand swelling)   Problem list:    All Problems  Hypertension / SNOMED CT 43816116 / Confirmed  Elevated cholesterol / SNOMED CT 35309834 / Confirmed  Obesity / SNOMED CT 1076136856 / Probable  Degenerative arthritis of knee / SNOMED CT 809263853 /  Confirmed  Seasonal allergies / SNOMED CT 903519582 / Confirmed   Medications:  (Selected)   Prescriptions  Prescribed  Azithromycin 5 Day Dose Pack 250 mg oral tablet: 2 tabs today then 1 a day for 4 days, PO, qAM, x 5 day(s), Instructions: as directed on package labeling, # 6 tab(s), 0 Refill(s), Type: Acute, Pharmacy: [facility], 2 tabs today then 1 a day for 4 days Oral qam,x5 day(s),Instr:as directed...  atorvastatin 20 mg oral tablet: = 1 tab(s) ( 20 mg ), po, daily, # 90 tab(s), 1 Refill(s), Type: Maintenance, Pharmacy: [facility], 1 tab(s) Oral daily  hydroCHLOROthiazide 25 mg oral tablet: = 1 tab(s) ( 25 mg ), po, daily, # 90 tab(s), 1 Refill(s), Type: Maintenance, Pharmacy: [facility], 1 tab(s) Oral daily  lisinopril 10 mg oral tablet: = 1 tab(s) ( 10 mg ), po, daily, # 90 tab(s), 1 Refill(s), Type: Maintenance, Pharmacy: [facility], 1 tab(s) Oral daily  Documented Medications  Documented  Fish Oil: po, 0 Refill(s), Type: Maintenance  Multiple Vitamins oral tablet: 1 tab(s), po, daily, 0 Refill(s), Type: Maintenance  Vitamin C: daily, 0 Refill(s), Type: Maintenance  garlic oral tablet: 0 Refill(s), Type: Maintenance      Histories   Past Medical History:    Active  Degenerative arthritis of knee (547623999)  Comments:  10/13/2014 CDT 9:12 AM CDT - Shivani Ayala  Bilateral  Elevated cholesterol (02041023)  Seasonal allergies (693454147)   Family History:    Liver cancer  Mother  Hypertension  Mother  Heart disease  Father  Parkinson's disease  Mother  Alzheimer's disease  Mother     Procedure history:    Screening for colon cancer (SNOMED CT 5263124976) performed by Jorge Tim MD on 7/25/2019 at 69 Years.  Comments:  8/5/2019 3:29 PM CDT - Misty GOTTLIEB, Estefani Lock resutl:  negative  Recommendation:  f/u 3yrs  Mammogram (SNOMED CT 283228409) on 8/3/2016 at 66 Years.  Comments:  8/4/2016 7:51 PM MARKT - Leni Cantu CMA 1 Negative; recommend  annual mammograms  Screening for malignant neoplasm of colon (SNOMED CT 3731210231) performed by Jorge Tim MD on 7/20/2016 at 66 Years.  Comments:  8/1/2016 6:54 PM CDT - Misty GOTTLIEB, Estefani Lock result--negative  F/U:   3yrs   Social History:        Alcohol Assessment: Current            Current, Wine (5 oz), 1-2 times per year, Ready to change: No.                     Comments:                      07/07/2017 - Shivani Ayala                     Occasionally      Tobacco Assessment: Denies Tobacco Use            Never (less than 100 in lifetime)      Substance Abuse Assessment: Denies Substance Abuse            Never      Employment and Education Assessment            Retired      Home and Environment Assessment            Marital status: .  Spouse/Partner name: Yao Trujillo.  Injuries/Abuse/Neglect in household: No.  Feels               unsafe at home: No.  Family/Friends available for support: Yes.  Risks in environment: Stairs.      Nutrition and Health Assessment            Type of diet: Regular.  Wants to lose weight: Yes.  Sleeping concerns: No.  Feels highly stressed: No.      Exercise and Physical Activity Assessment: Does not exercise            Exercise frequency: Never.      Sexual Assessment            Identifies as female, Sexual orientation: Straight or heterosexual.  History of STD: No.  History of sexual               abuse: No.        Physical Examination   Vital Signs   10/4/2019 1:03 PM CDT Temperature Tympanic 98.7 DegF    Peripheral Pulse Rate 102 bpm  HI    HR Method Electronic    Systolic Blood Pressure 145 mmHg  HI    Diastolic Blood Pressure 97 mmHg  HI    Mean Arterial Pressure 113 mmHg    BP Method Electronic    Oxygen Saturation 97 %      Measurements from flowsheet : Measurements   10/4/2019 1:03 PM CDT Height Measured - Standard 64 in    Weight Measured - Standard 223.4 lb    BSA 2.14 m2    Body Mass Index 38.34 kg/m2  HI      General:  Alert and oriented, No acute  distress.    Eye:  Normal conjunctiva.    HENT:  Normocephalic, Tympanic membranes are clear, Oral mucosa is moist, No pharyngeal erythema.    Neck:  Supple, Non-tender, No lymphadenopathy, No thyromegaly.    Respiratory:  Breath sounds are equal, Symmetrical chest wall expansion.         Respirations: Are within normal limits.         Pattern: Regular.         Breath sounds: Bilateral.         Breath sounds: Crackles present.    Cardiovascular:  Normal rate, Regular rhythm, No murmur, Normal peripheral perfusion, No edema.    Gastrointestinal:  Soft, Non-tender, Non-distended, Normal bowel sounds, No organomegaly.    Genitourinary:  No costovertebral angle tenderness.    Integumentary:  Warm, Dry, No rash.       Impression and Plan   Diagnosis     Bacterial pneumonia (VXN44-ZI J15.9).     Course:  Not progressing as expected.    Plan:  zpack  fu 1 week if not better sooner if worse.         Refer to: Reviewed when to return to clinic and anticipatory guidance. Also reviewed to return sooner if no improvement or worsening.    Patient Instructions:       Counseled: Patient, Regarding diagnosis, Regarding treatment, Regarding medications, Regarding activity, Verbalized understanding.    Orders

## 2022-02-15 NOTE — LETTER
(Inserted Image. Unable to display)   July 28, 2021  JULIEN WU  [address] [place] [address]          Dear JULIEN VITALE,      Thank you for selecting [facility] for your healthcare needs.    Our records indicate you are due for the following services:     Annual Wellness Visit    (FYI   Regarding office visits: In some instances, a video visit or telephone visit may be offered as an option.)        To schedule an appointment or if you have further questions, please contact your clinic at [phone].      Powered by [employer]    Sincerely,    Jorge Tim M.D.

## 2022-02-15 NOTE — TELEPHONE ENCOUNTER
---------------------  From: Meli Parra CMA (Phone Messages Pool ([id number]_[facility]))   To: [facility] Message Pool ([facility]);     Sent: 1/18/2021 9:10:34 AM CST  Subject: General Message     Phone Message    PCP:    TFS      Time of Call:  8:33 am       Person Calling:  Walter  Phone number:  [phone] ok to leave detailed message    Returned call at: LM 9:06 am    Note:  Patient called stating her insurance will not cover the Tolterodine. I called patients Pharmacy and the insurance company will cover: Oxybutynin, Toviaz and Myrbetriq (expensive). Please send in alternative medication. Called and LM notifying the patient that [facility] is out of clinic today, but will address tomorrow and send in alternative medication.     Last office visit and reason:  1/13/2021 HTN    Transferred to: [facility] message pool    [facility] [place]---------------------  From: Stephanie Regan ([facility] Message Pool ([id number]_[facility]))   To: Jorge Tim MD;     Sent: 1/19/2021 7:45:36 AM CST  Subject: FW: General Message---------------------  From: Jorge Tim MD   To: [facility] Message Pool ([address]_[place] [facility] [place]);     Sent: 1/19/2021 7:55:41 AM CST  Subject: RE: General Message     oxybutynin 5 mg qhs dsp 90 3 refPatient notified, rx sent.

## 2022-02-15 NOTE — PROGRESS NOTES
Patient:   WALTER WU WINIFRED            MRN: [id number]            FIN: [id number]               Age:   70 years     Sex:  Female     :  1950   Associated Diagnoses:   Elevated cholesterol; Hypertension; Obesity   Author:   Ghanshyam Cheney MD      Visit Information      Date of Service: 2020 09:16 am  Performing Location: [facility]  Encounter#: [id number]      Primary Care Provider (PCP):  Jorge Tim MD    NPI# [id number]      Referring Provider:  Ghanshyam Cheney MD    NPI# [id number]      Chief Complaint   2020 9:26 AM CST    Renal f/u - review results              Additional Information:No additional information recorded during visit.   Chief complaint and symptoms as noted above and confirmed with patient.  Recent lab and diagnostic studies reviewed with patient      History of Present Illness   10/14/2020: Walter is referred to me by Dr. Tim for evaluation of her chronic kidney disease and hypertension. She shares having multiple acute gouty flares limited to her right great toe at the end of July and then in September. She was on extended course of prednisone on both occasions. These are her first known gout flares historically. She has been maintained on lisinopril and hydrochlorothiazide for approximately 5 years. Both were stopped in July at time of first gout episode. She was changed over to Toprol-XL and amlodipine formulation. Baseline serum creatinine of 1-1.1. More recently with creatinine elevation to 1.2. Denies any use of NSAIDs. No family history of renal disease  2020: Walter returns for follow-up.  Overall doing well.  No further gout related issues.  She reintroduce lisinopril after her last visit without problems.  We reviewed her recent diagnostic testing including renal ultrasound and urinalyses.  Her repeat labs demonstrating an improvement and serum creatinine down to 0.9.         Review of Systems   Constitutional:  No fever, No chills.    Eye:   Negative except as documented in history of present illness.    Ear/Nose/Mouth/Throat:  Negative except as documented in history of present illness.    Respiratory:  No shortness of breath.    Cardiovascular:  No chest pain, No palpitations, No peripheral edema, No syncope.    Gastrointestinal:  No nausea, No vomiting, No abdominal pain.    Genitourinary:  No dysuria, No hematuria.    Hematology/Lymphatics:  Negative except as documented in history of present illness.    Endocrine:  No excessive thirst, No polyuria.    Immunologic:  No recurrent fevers.    Musculoskeletal:  Joint pain, No muscle pain.    Neurologic:  Alert and oriented X4, No numbness, No tingling, No headache.       Health Status   Allergies:    Allergic Reactions (Selected)  Severity Not Documented  Amoxicillin (Rash and hand swelling)   Medications:  (Selected)   Prescriptions  Prescribed  amLODIPine 5 mg oral tablet: = 1 tab(s) ( 5 mg ), Oral, daily, # 90 tab(s), 0 Refill(s), Type: Maintenance, Pharmacy: [facility], 1 tab(s) Oral daily, 64, in, 09/02/20 9:01:00 CDT, Height Measured, 220.1, lb, 07/23/20 8:39:00 CDT, Weight Measured  atorvastatin 20 mg oral tablet: = 1 tab(s) ( 20 mg ), po, daily, # 90 tab(s), 1 Refill(s), Type: Maintenance, Pharmacy: [place] [id number], 1 tab(s) Oral daily, 64, in, 07/23/20 8:39:00 CDT, Height Measured, Weight Measured  indomethacin 25 mg oral capsule: = 1 cap(s) ( 25 mg ), Oral, tid, PRN: for gout pain, # 30 cap(s), 0 Refill(s), Type: Maintenance, Pharmacy: [facility], 1 cap(s) Oral tid,PRN:for gout pain, 64, in, 10/14/20 9:04:00 CDT, Height Measured, 226, lb, 10/14/20 9:04:00 CDT, Weigh...  lisinopril 10 mg oral tablet: = 2 tab(s) ( 20 mg ), po, daily, # 90 tab(s), 1 Refill(s), Type: Maintenance, Pharmacy: [facility], 64, in, 07/23/20 8:39:00 CDT, Height Measured, Weight Measured  metoprolol succinate 25 mg oral tablet, extended release: = 1 tab(s) ( 25 mg ), Oral, daily, #  90 tab(s), 1 Refill(s), Type: Maintenance, Pharmacy: [facility], 1 tab(s) Oral daily, 64, in, 09/23/20 8:46:00 CDT, Height Measured, 226.3, lb, 09/23/20 8:46:00 CDT, Weight Measured  Documented Medications  Documented  Fish Oil: po, 0 Refill(s), Type: Maintenance  Multiple Vitamins oral tablet: 1 tab(s), po, daily, 0 Refill(s), Type: Maintenance  Vitamin C: daily, 0 Refill(s), Type: Maintenance  garlic oral tablet: 0 Refill(s), Type: Maintenance,    Medications          *denotes recorded medication          amLODIPine 5 mg oral tablet: 5 mg, 1 tab(s), Oral, daily, 90 tab(s), 0 Refill(s).          *Vitamin C: daily.          atorvastatin 20 mg oral tablet: 20 mg, 1 tab(s), po, daily, 90 tab(s), 1 Refill(s).          *garlic oral tablet: 0 Refill(s), Type: Maintenance.          indomethacin 25 mg oral capsule: 25 mg, 1 cap(s), Oral, tid, PRN: for gout pain, 30 cap(s), 0 Refill(s).          lisinopril 10 mg oral tablet: 20 mg, 2 tab(s), po, daily, 90 tab(s), 1 Refill(s).          metoprolol succinate 25 mg oral tablet, extended release: 25 mg, 1 tab(s), Oral, daily, 90 tab(s), 1 Refill(s).          *Multiple Vitamins oral tablet: 1 tab(s), po, daily.          *Fish Oil: po.       Problem list:    All Problems  Hypertension / SNOMED CT 47433294 / Confirmed  Chronic gout / SNOMED CT 462186585 / Confirmed  Elevated cholesterol / SNOMED CT 68856100 / Confirmed  Obesity / SNOMED CT 3546145097 / Probable  Degenerative arthritis of knee / SNOMED CT 006147991 / Confirmed  Seasonal allergies / SNOMED CT 559342400 / Confirmed      Histories   Past Medical History:    Active  Obesity (8340696456)  Degenerative arthritis of knee (756687895)  Comments:  10/13/2014 CDT 9:12 AM CDT - Shivani Ayala  Bilateral  Elevated cholesterol (01240246)  Hypertension (01492150)  Seasonal allergies (843385810)  Chronic gout (005869338)   Family History:    Liver cancer  Mother  Hypertension  Mother  Heart disease  Father  Parkinson's  disease  Mother  Alzheimer's disease  Mother     Procedure history:    Screening for colon cancer ([id number]) on 7/25/2019 at 69 Years.  Comments:  8/5/2019 3:29 PM Estefani Dumas MA resutl:  negative  Recommendation:  f/u 3yrs  Mammogram ([id number]) on 8/3/2016 at 66 Years.  Comments:  8/4/2016 7:51 PM CDT - Alysia Cantu CMAnifer  ACR 1 Negative; recommend annual mammograms  Screening for malignant neoplasm of colon ([id number]) on 7/20/2016 at 66 Years.  Comments:  8/1/2016 6:54 PM Estefani Dumas MA result--negative  F/U:   3yrs   Social History:        Alcohol Assessment: Current            Wine (5 oz), 1-2 times per year, 1 drinks/episode average.  2 drinks/episode maximum.  Ready to change: No.                     Comments:                      07/07/2017 - Shivani Ayala      Tobacco Assessment: Denies Tobacco Use            Never (less than 100 in lifetime)      Substance Abuse Assessment: Denies Substance Abuse            Never      Employment and Education Assessment            Retired      Home and Environment Assessment            Marital status: .  Spouse/Partner name: Yao Au.  Living situation: Home/Independent.               Injuries/Abuse/Neglect in household: No.  Feels unsafe at home: No.  Family/Friends available for support:               Yes.  Risks in environment: Stairs.      Nutrition and Health Assessment            Type of diet: Regular.  Wants to lose weight: Yes.  Sleeping concerns: No.  Feels highly stressed: No.      Exercise and Physical Activity Assessment: Does not exercise            Exercise frequency: Never.      Sexual Assessment            Sexually active: Yes.  Identifies as female, Sexual orientation: Straight or heterosexual.  History of STD:               No.  History of sexual abuse: No.        Physical Examination   vital signs stable, as noted above   Vital Signs   11/4/2020 9:26 AM CST  Temperature Tympanic 98.7 DegF    Peripheral Pulse Rate 74 bpm    Systolic Blood Pressure 144 mmHg  HI    Diastolic Blood Pressure 76 mmHg    Mean Arterial Pressure 99 mmHg    Oxygen Saturation 97 %      Measurements from flowsheet : Measurements   11/4/2020 9:26 AM CST Height Measured - Standard 64 in    Weight Measured - Standard 226 lb    BSA 2.15 m2    Body Mass Index 38.79 kg/m2  HI      General:  Alert and oriented, No acute distress.    Eye:  Extraocular movements are intact.    HENT:  Normocephalic.    Neck:  Supple, No carotid bruit.    Respiratory:  Lungs are clear to auscultation, Respirations are non-labored.    Cardiovascular:  Normal rate, Regular rhythm, No murmur, No edema.    Gastrointestinal:  Soft, Non-tender, Non-distended.    Musculoskeletal:  Normal range of motion, Normal strength.    Neurologic:  Alert, Oriented, Normal motor function, No focal deficits.    Cognition and Speech:  Oriented, Speech clear and coherent.    Psychiatric:  Appropriate mood & affect.       Review / Management   Results review:  Lab results   10/14/2020 9:54 AM CDT Sodium Level 137 mmol/L    Potassium Level 4.4 mmol/L    Chloride Level 101 mmol/L    CO2 Level 28 mmol/L    Glucose Level 119 mg/dL  HI    BUN 17 mg/dL    Creatinine 0.94 mg/dL    BUN/Creat Ratio NOT APPLICABLE    eGFR 62 mL/min/1.73m2    eGFR African American 72 mL/min/1.73m2    Calcium Level 10.1 mg/dL    PTH Intact 60 pg/mL    Albumin Level 4.4 gm/dL    U Creatinine 155 mg/dL    U Microalbumin 1.4 mg/dL    Ur Microalb/Creat Ratio 9    Hgb 14.8 gm/dL    UA Color YELLOW    UA Appear CLEAR    UA pH 6.5    UA Specific Gravity 1.020    UA Glucose NEGATIVE    UA Bilirubin NEGATIVE    UA Ketones NEGATIVE    UA Blood NEGATIVE    UA Protein NEGATIVE    UA Nitrite NEGATIVE    UA Leuk Est NEGATIVE    UA Squam Epithelial 6-10 /HPF    UA Hyaline Cast NONE SEEN /LPF    UA WBC NONE SEEN /HPF    UA RBC 0-2 /HPF    UA CA Ox Crystal FEW /HPF    UA Bacteria NONE SEEN /HPF    9/16/2020 8:33 AM CDT Sodium Level 133 mmol/L  LOW    Potassium Level 4.6 mmol/L    Chloride Level 94 mmol/L  LOW    CO2 Level 31 mmol/L    Glucose Level 100 mg/dL  HI    BUN 27 mg/dL  HI    Creatinine 1.20 mg/dL  HI    BUN/Creat Ratio 23  HI    eGFR 46 mL/min/1.73m2  LOW    eGFR African American 53 mL/min/1.73m2  LOW    Calcium Level 9.9 mg/dL    Uric Acid 8.5 mg/dL  HI   .       Impression and Plan   Diagnosis     Elevated cholesterol (BKB53-FI E78.00).     Hypertension (MCS39-ST I10).     Obesity (GDO63-UR E66.9).         .) elevated SCr to 1.2; subsequently improved to 0.9 with previous cessation of thiazide and ACEi - confirming more hemodynamic, reversible changes in GFR  - renal U/S (10/2020): symmetric; simple cysts onlt  - UA: no microhematuria  - ANTONY - negative  - hold off on any further work-up    .) hypertension, uncontrolled  current antihypertensive regimen: amlodipine 5mg daily, Toprol XL 25mg daily, lisinopril 10mg daily  regimen changes: increase lisinopril to 20mg daily  intolerance: hctz (hyperuricemia, gout)  future titration/work-up plan:   bp goal <130/80    .) gout, association with previous thiazide use  - last uric acid level 8.5  -indomethacin prn use    No dedicated renal follow-up, intends to see TFS in early 2021

## 2022-02-15 NOTE — TELEPHONE ENCOUNTER
---------------------  From: Leni Cantu LPN   To: Referral Coordinators Pool ([id number]_[facility]);     Sent: 7/28/2021 10:35:27 AM CDT  Subject: Imaging Order       Orders have been placed for a screening bilateral mammogram and a DEXA scan.

## 2022-02-15 NOTE — TELEPHONE ENCOUNTER
[facility] will contact patient to schedule.

## 2022-02-15 NOTE — NURSING NOTE
CAGE Assessment Entered On:  7/23/2020 8:43 AM CDT    Performed On:  7/23/2020 8:43 AM CDT by Stephanie Regan               Assessment   Have you ever felt you should cut down on your drinking :   No   Have people annoyed you by criticizing your drinking :   No   Have you ever felt bad or guilty about your drinking :   No   Have you ever taken a drink first thing in the morning to steady your nerves or get rid of a hangover (Eye-opener) :   No   CAGE Score :   0    Stephanie Regan - 7/23/2020 8:43 AM CDT

## 2022-02-15 NOTE — TELEPHONE ENCOUNTER
Entered by Cheli De La Rosa CMA on November 30, 2020 12:22:41 PM CST  ---------------------  From: Cheli De La Rosa CMA   To: [place] [id number]    Sent: 11/30/2020 12:22:41 PM CST  Subject: Medication Management     ** Submitted: **  Order:amLODIPine (amLODIPine 5 mg oral tablet)  1 tab(s)  Oral  daily  Qty:  30 tab(s)        Refills:  0          Substitutions Allowed     Route To Pharmacy - [place] [address]    Signed by Cheli De La Rosa CMA  11/30/2020 6:21:00 PM [facility]    ** Submitted: **  Complete:amLODIPine (amLODIPine 5 mg oral tablet)   Signed by Cheli De La Rosa CMA  11/30/2020 6:22:00 PM [facility]    ** Not Approved:  **  amLODIPine (amLODIPine Besylate 5 MG Oral Tablet)  Take 1 tablet by mouth once daily  Qty:  90 tab(s)        Days Supply:  90        Refills:  0          Substitutions Allowed     Route To Pharmacy [place] [address]   Signed by Cheli De La Rosa CMA            ------------------------------------------  From: [place] [id number]  To: Jorge Tim MD  Sent: November 29, 2020 11:47:15 AM CST  Subject: Medication Management  Due: November 26, 2020 4:59:30 PM CST     ** On Hold Pending Signature **     Dispensed Drug: amLODIPine (amLODIPine 5 mg oral tablet), Take 1 tablet by mouth once daily  Quantity: 90 tab(s)  Days Supply: 90  Refills: 0  Substitutions Allowed  Notes from Pharmacy:  ------------------------------------------11/4/20 [facility] December

## 2022-02-15 NOTE — NURSING NOTE
Comprehensive Intake Entered On:  1/13/2021 9:39 AM CST    Performed On:  1/13/2021 9:36 AM CST by Stephanie Regan               Summary   Chief Complaint :   Chronic Disease Visit   Advance Directive :   Yes   Weight Measured :   225.8 lb(Converted to: 225 lb 13 oz, 102.421 kg)    Height Measured :   64 in(Converted to: 5 ft 4 in, 162.56 cm)    Body Mass Index :   38.75 kg/m2 (HI)    Body Surface Area :   2.15 m2   Systolic Blood Pressure :   145 mmHg (HI)    Diastolic Blood Pressure :   79 mmHg   Mean Arterial Pressure :   101 mmHg   Peripheral Pulse Rate :   80 bpm   Vital Signs Comments :   thigh cuff used   BP Method :   Electronic   HR Method :   Electronic   Temperature Tympanic :   98.7 DegF(Converted to: 37.1 DegC)    Stephanie Regan - 1/13/2021 9:36 AM CST   Health Status   Allergies Verified? :   Yes   Medication History Verified? :   Yes   Pre-Visit Planning Status :   Completed   Tobacco Use? :   Never smoker   Stephanie Regan - 1/13/2021 9:36 AM CST   ID Risk Screen   Recent Travel History :   No recent travel   Family Member Travel History :   No recent travel   Other Exposure to Infectious Disease :   Unknown   Stephanie Regan - 1/13/2021 9:36 AM CST   Social History   Social History   (As Of: 1/13/2021 9:39:30 AM CST)   Alcohol:  Current      Wine (5 oz), 1-2 times per year, 1 drinks/episode average.  2 drinks/episode maximum.  Ready to change: No.   Comments:  7/7/2017 10:45 AM - Shivani Ayala: Occasionally   (Last Updated: 8/7/2020 9:57:05 AM CDT by Margaret Lynne)          Tobacco:        Never (less than 100 in lifetime)   (Last Updated: 1/13/2021 9:36:48 AM CST by Stephanie Regan)          Electronic Cigarette/Vaping:        Electronic Cigarette Use: Never.   (Last Updated: 1/13/2021 9:36:51 AM CST by Stephanie Regan)          Substance Abuse:  Denies Substance Abuse      Never   (Last Updated: 10/10/2014 3:12:10 PM CDT by Stephanie Regan)          Employment/School:        Retired    (Last Updated: 10/10/2014 3:11:47 PM CDT by Stephanie Regan)          Home/Environment:        Marital status: .  Spouse/Partner name: Yao Au.  Living situation: Home/Independent.  Injuries/Abuse/Neglect in household: No.  Feels unsafe at home: No.  Family/Friends available for support: Yes.  Risks in environment: Stairs.   (Last Updated: 8/7/2020 9:56:28 AM CDT by Margaret Lynne)          Nutrition/Health:        Type of diet: Regular.  Wants to lose weight: Yes.  Sleeping concerns: No.  Feels highly stressed: No.   (Last Updated: 7/12/2019 1:59:34 PM CDT by Heather Rutherford)          Exercise:  Does not exercise      Exercise frequency: Never.   (Last Updated: 7/12/2019 1:59:51 PM CDT by Heather Rutherford)          Sexual:        Sexually active: Yes.  Identifies as female, Sexual orientation: Straight or heterosexual.  History of STD: No.  History of sexual abuse: No.   (Last Updated: 8/7/2020 9:57:48 AM CDT by Margaret Lynne)

## 2022-02-15 NOTE — NURSING NOTE
Medicare Visit Entered On:  7/28/2021 10:06 AM CDT    Performed On:  7/28/2021 9:52 AM CDT by Jagdish Cope LPN               Summary   Chief Complaint :   AWV MEDICARE VISIT   Advance Directive :   Yes   Weight Measured :   227 lb(Converted to: 227 lb 0 oz, 102.965 kg)    Height Measured :   64 in(Converted to: 5 ft 4 in, 162.56 cm)    Body Mass Index :   38.96 kg/m2 (HI)    Body Surface Area :   2.15 m2   Systolic Blood Pressure :   132 mmHg (HI)    Diastolic Blood Pressure :   84 mmHg (HI)    Mean Arterial Pressure :   100 mmHg   Peripheral Pulse Rate :   60 bpm   BP Site :   Right arm   Pulse Site :   Radial artery   BP Method :   Manual   HR Method :   Manual   Jagdish Cope LPN - 7/28/2021 9:52 AM CDT   Consents   Consent for Immunization Exchange :   Consent Granted   Consent for Immunizations to Providers :   Consent Granted   Jagdish Cope LPN - 7/28/2021 9:52 AM CDT   Meds / Allergies   (As Of: 7/28/2021 10:06:07 AM CDT)   Allergies (Active)   amoxicillin  Estimated Onset Date:   Unspecified ; Reactions:   rash, hand swelling ; Created By:   Marry Montalvo RN; Reaction Status:   Active ; Category:   Drug ; Substance:   amoxicillin ; Type:   Allergy ; Updated By:   Marry Montalvo RN; Reviewed Date:   7/28/2021 9:52 AM CDT        Medication List   (As Of: 7/28/2021 10:06:07 AM CDT)   Prescription/Discharge Order    amLODIPine  :   amLODIPine ; Status:   Prescribed ; Ordered As Mnemonic:   amLODIPine 5 mg oral tablet ; Simple Display Line:   1 tab(s), Oral, daily, 90 tab(s), 0 Refill(s) ; Ordering Provider:   Jorge Tim MD; Catalog Code:   amLODIPine ; Order Dt/Tm:   7/6/2021 2:54:47 PM CDT          atorvastatin  :   atorvastatin ; Status:   Prescribed ; Ordered As Mnemonic:   atorvastatin 20 mg oral tablet ; Simple Display Line:   20 mg, 1 tab(s), po, daily, 90 tab(s), 1 Refill(s) ; Ordering Provider:   Jorge Tim MD; Catalog Code:   atorvastatin ; Order Dt/Tm:   1/13/2021  10:13:45 AM CST          indomethacin  :   indomethacin ; Status:   Prescribed ; Ordered As Mnemonic:   indomethacin 25 mg oral capsule ; Simple Display Line:   25 mg, 1 cap(s), Oral, tid, PRN: for gout pain, 30 cap(s), 0 Refill(s) ; Ordering Provider:   Ghanshyam Cheney MD; Catalog Code:   indomethacin ; Order Dt/Tm:   10/14/2020 9:32:40 AM CDT          lisinopril  :   lisinopril ; Status:   Prescribed ; Ordered As Mnemonic:   lisinopril 20 mg oral tablet ; Simple Display Line:   1 tab(s), Oral, daily, 90 tab(s), 0 Refill(s) ; Ordering Provider:   Jorge Tim MD; Catalog Code:   lisinopril ; Order Dt/Tm:   7/6/2021 2:55:04 PM CDT          metoprolol  :   metoprolol ; Status:   Prescribed ; Ordered As Mnemonic:   metoprolol succinate 25 mg oral tablet, extended release ; Simple Display Line:   25 mg, 1 tab(s), Oral, daily, 90 tab(s), 1 Refill(s) ; Ordering Provider:   Jorge Tim MD; Catalog Code:   metoprolol ; Order Dt/Tm:   1/13/2021 10:13:46 AM CST          oxybutynin  :   oxybutynin ; Status:   Prescribed ; Ordered As Mnemonic:   oxybutynin 5 mg/24 hours oral tablet, extended release ; Simple Display Line:   5 mg, 1 tab(s), Oral, daily, 90 tab(s), 3 Refill(s) ; Ordering Provider:   Jorge Tim MD; Catalog Code:   oxybutynin ; Order Dt/Tm:   1/19/2021 10:15:53 AM CST            Home Meds    ascorbic acid  :   ascorbic acid ; Status:   Documented ; Ordered As Mnemonic:   Vitamin C ; Simple Display Line:   daily ; Catalog Code:   ascorbic acid ; Order Dt/Tm:   10/10/2014 8:57:56 AM CDT          garlic  :   garlic ; Status:   Documented ; Ordered As Mnemonic:   garlic oral tablet ; Catalog Code:   garlic ; Order Dt/Tm:   10/27/2014 8:57:13 AM CDT          multivitamin  :   multivitamin ; Status:   Documented ; Ordered As Mnemonic:   Multiple Vitamins oral tablet ; Simple Display Line:   1 tab(s), po, daily ; Catalog Code:   multivitamin ; Order Dt/Tm:   10/10/2014 8:57:49 AM CDT          omega-3  polyunsaturated fatty acids  :   omega-3 polyunsaturated fatty acids ; Status:   Documented ; Ordered As Mnemonic:   Fish Oil ; Simple Display Line:   po ; Catalog Code:   omega-3 polyunsaturated fatty acids ; Order Dt/Tm:   10/27/2014 8:57:25 AM CDT            Geriatric Depression Screening   Geriatric Depression Satisfied Life :   Yes   Geriatric Depression Dropped Activities :   No   Geriatric Depression Life Empty :   No   Geriatric Depression Bored :   No   Geriatric Depression Good Spirits :   Yes   Geriatric Depression Afraid Bad Things :   No   Geriatric Depression Feel Happy :   Yes   Geriatric Depression Feel Helpless :   No   Geriatric Depression Prefer to Stay Home :   No   Geriatric Depression Memory Problems :   No   Geriatric Depression Wonderful Be Alive :   Yes   Geriatric Depression Feel Worthless :   No   Geriatric Depression Situation Hopeless :   No   Geriatric Depression Others Better Off :   No   Geriatric Depression Full of Energy :   Yes   Geriatric Depression Total Score :   0    Prisca PINEDA [facility] 7/28/2021 9:52 AM CDT   Hearing and Vision Screening   Audiogram Result Right Ear :   Pass   Audiogram Result Left Ear :   Pass   Visual Acuity Evaluated Left Eye :   20/20   Visual Acuity Evaluated Right Eye :   20/20   Vision Test Type :   Snellen   Vision Screen Comments :   Wears Glasses   Jagdish Cope LPN  7/28/2021 9:52 AM CDT   Home Safety Screen   Emergency Numbers Kept/Updated :   Yes   Aware of Smoking Dangers :   Yes   Smoke Alarms/Fire Extinguisher Available :   Yes   Household Members Fire Safety Knowledge :   Yes   Floor Rugs Removed or Fastened :   Yes   Mats in Bathtub/Shower :   Yes   Stairway Rails or Banisters :   Yes   Outdoor Clutter Safety :   Yes   Indoor Clutter Safety :   Yes   Electrical Cord Safety :   Yes   Prisca PINEDA [facility] 7/28/2021 9:52 AM CDT   Advance Directives   Advance Directive :   Yes   Advanced Directives Status :   Current and verified    Advance Directive Type :   Medical durable power of    Medical Power of  Name :   Yao Au   Advance Directive Intent Stated By :   Self   Advance Directive Additional Information :   No   Prisca PINEDA [facility] - 7/28/2021 9:52 AM CDT   Salter Fall Risk   History of Fall in Last 3 Months Salter :   No   Prisca PINEDA [facility] 7/28/2021 9:52 AM CDT   Functional Assessment   Focused Functional Assessment Grid   Bathing :   Independent (2)   Dressing :   Independent (2)   Toileting :   Independent (2)   Transferring Bed or Chair :   Independent (2)   Continence :   Independent (2)   Feeding :   Independent (2)   Prisca PINEDA [facility] 7/28/2021 9:52 AM CDT   ADL Index Score :   12    Capable of Shopping :   Yes   Capable of Walking :   Yes   Capable of Housekeeping :   Yes   Capable of Managing Medications :   Yes   Capable of Handling Finances :   Yes   Prisca PINEDA [facility] 7/28/2021 9:52 AM CDT

## 2022-02-15 NOTE — TELEPHONE ENCOUNTER
Entered by Stephanie Regan on July 06, 2021 2:55:18 PM CDT  ---------------------  From: Stephanie Regan   To: [facility]    Sent: 7/6/2021 2:55:18 PM CDT  Subject: FW: Medication Management     ** Submitted: **  Order:lisinopril (lisinopril 20 mg oral tablet)  1 tab(s)  Oral  daily  Qty:  90 tab(s)        Days Supply:  90        Refills:  0          Substitutions Allowed     Route To [facility]    Signed by Stephanie Regan  7/6/2021 7:55:00 PM UT    ** Submitted: **  Complete:lisinopril (lisinopril 20 mg oral tablet)   Signed by Stephanie Regan  7/6/2021 7:55:00 PM UT    ** Not Approved:  **  lisinopril (Lisinopril 20 MG Oral Tablet)  Take 1 tablet by mouth once daily  Qty:  90 tab(s)        Days Supply:  90        Refills:  0          Substitutions Allowed     Route To [facility]   Signed by Stephanie Regan            ** Submitted: **  Order:amLODIPine (amLODIPine 5 mg oral tablet)  1 tab(s)  Oral  daily  Qty:  90 tab(s)        Days Supply:  90        Refills:  0          Substitutions Allowed     Route To [facility]    Signed by Stephanie Regan  7/6/2021 7:54:00 PM UT    ** Submitted: **  Complete:amLODIPine (amLODIPine 5 mg oral tablet)   Signed by Stephanie Regan  7/6/2021 7:54:00 PM UT    ** Not Approved:  **  amLODIPine (amLODIPine Besylate 5 MG Oral Tablet)  Take 1 tablet by mouth once daily  Qty:  90 tab(s)        Days Supply:  90        Refills:  0          Substitutions Allowed     Route To [facility]   Signed by Stephanie Regan          Med Refill      AWV scheduled for 7/28/21.  Will fill    For Protocol refill, has patient been contacted:  _    Medication filled or not filled per clinic policy.       ---------------------  From: Stephanie Regan (eRx Pool ([id number]_[facility]))   To: [facility] Message Pool ([id number]_[place] [facility] [place]);     Sent: 7/6/2021 2:46:52 PM CDT  Subject: FW:  Medication Management   Due Date/Time: 7/7/2021 10:02:00 AM CDT           ------------------------------------------  From: [id number]  To: Jorge Tim MD  Sent: July 6, 2021 10:02:44 AM CDT  Subject: Medication Management  Due: June 4, 2021 8:26:15 PM CDT     ** On Hold Pending Signature **     Dispensed Drug: amLODIPine (amLODIPine 5 mg oral tablet), Take 1 tablet by mouth once daily  Quantity: 90 tab(s)  Days Supply: 90  Refills: 0  Substitutions Allowed  Notes from Pharmacy:     ** On Hold Pending Signature **     Dispensed Drug: lisinopril (lisinopril 20 mg oral tablet), Take 1 tablet by mouth once daily  Quantity: 90 tab(s)  Days Supply: 90  Refills: 0  Substitutions Allowed  Notes from Pharmacy:  ------------------------------------------

## 2022-02-15 NOTE — PROGRESS NOTES
Patient:   JULIEN WU            MRN: [id number]            FIN: [id number]               Age:   70 years     Sex:  Female     :  1950   Associated Diagnoses:   Encounter for Medicare annual wellness exam; Elevated cholesterol; Hypertension; Chronic gout   Author:   Jorge Tim MD      Visit Information      Date of Service: 2020 08:33 am  Performing Location: [facility]  Encounter#: [id number]      Primary Care Provider (PCP):  Jorge Tim MD# [id number]      Referring Provider:  Jorge Tim MD# [id number]   Visit type:  Annual exam.       Chief Complaint   2020 8:39 AM CDT    AWV     Medicare Initial / Annual Preventative Visit      Well Adult History   Well Adult History             The patient presents for well adult exam.  ADL's and Safety were reviewed.  ___none___ found.  Please see copy of scanned form.    I have reviewed with the patient the completed health risk assessment and health history form and we have agreed on the following plans for identified risk factors. ___none____ found.  Please see copy of scanned form.  She is here for follow-up on her hypertension which is been under reasonable control hyperlipidemia which is also been under reasonable control.  Her first episode of gout settle down nicely.  We discussed with her getting her off hydrochlorothiazide if it continues to recur.     .        Review of Systems   Eye:  See Preventative Services Checklist for Vision/Glaucoma Test dates..    Ear/Nose/Mouth/Throat:  Negative, Hearing is evaluated..    Respiratory:  Negative.    Cardiovascular:  Negative.    Gastrointestinal:  Negative.    Genitourinary:  Negative.    Integumentary:  Negative.    Neurologic:  Negative.    Psychiatric:  PHQ-9 Noted.    See completed Health History for Review of Systems.      Health Status   Allergies:    Allergic Reactions (Selected)  Severity Not Documented  Amoxicillin (Rash and hand swelling)    Medications:  (Selected)   Prescriptions  Prescribed  atorvastatin 20 mg oral tablet: = 1 tab(s) ( 20 mg ), po, daily, # 90 tab(s), 1 Refill(s), Type: Maintenance, Pharmacy: [facility], 1 tab(s) Oral daily, 64, in, 07/23/20 8:39:00 CDT, Height Measured, Weight Measured  hydroCHLOROthiazide 25 mg oral tablet: = 1 tab(s) ( 25 mg ), po, daily, # 90 tab(s), 1 Refill(s), Type: Maintenance, Pharmacy: [facility], 1 tab(s) Oral daily, 64, in, 07/23/20 8:39:00 CDT, Height Measured, Weight Measured  lisinopril 10 mg oral tablet: = 1 tab(s) ( 10 mg ), po, daily, # 90 tab(s), 1 Refill(s), Type: Maintenance, Pharmacy: [place] [id number], 1 tab(s) Oral daily, 64, in, 07/23/20 8:39:00 CDT, Height Measured, Weight Measured  Documented Medications  Documented  Fish Oil: po, 0 Refill(s), Type: Maintenance  Multiple Vitamins oral tablet: 1 tab(s), po, daily, 0 Refill(s), Type: Maintenance  Vitamin C: daily, 0 Refill(s), Type: Maintenance  garlic oral tablet: 0 Refill(s), Type: Maintenance,    Medications          *denotes recorded medication          *Vitamin C: daily.          atorvastatin 20 mg oral tablet: 20 mg, 1 tab(s), po, daily, 90 tab(s), 1 Refill(s).          *garlic oral tablet: 0 Refill(s), Type: Maintenance.          hydroCHLOROthiazide 25 mg oral tablet: 25 mg, 1 tab(s), po, daily, 90 tab(s), 1 Refill(s).          lisinopril 10 mg oral tablet: 10 mg, 1 tab(s), po, daily, 90 tab(s), 1 Refill(s).          *Multiple Vitamins oral tablet: 1 tab(s), po, daily.          *Fish Oil: po.       Problem list:    All Problems  Hypertension / SNOMED CT 43569343 / Confirmed  Elevated cholesterol / SNOMED CT 74155537 / Confirmed  Obesity / SNOMED CT 0265552437 / Probable  Degenerative arthritis of knee / SNOMED CT 704595361 / Confirmed  Seasonal allergies / SNOMED CT 423068592 / Confirmed      Histories   Past Medical History:    Active  Degenerative arthritis of knee (126995620)  Comments:  10/13/2014 CDT  9:12 AM MARKT - Shivani Ayala  Bilateral  Elevated cholesterol (96203046)  Seasonal allergies (235426705)   Family History:    Liver cancer  Mother  Hypertension  Mother  Heart disease  Father  Parkinson's disease  Mother  Alzheimer's disease  Mother     Procedure history:    Screening for colon cancer (SNOMED CT 4809878163) performed by Jorge Tim MD on 7/25/2019 at 69 Years.  Comments:  8/5/2019 3:29 PM MARKT - Estefani Jay MA resutl:  negative  Recommendation:  f/u 3yrs  Mammogram (SNOMED CT 304449868) on 8/3/2016 at 66 Years.  Comments:  8/4/2016 7:51 PM CDT - Leni Cantu CMA  ACR 1 Negative; recommend annual mammograms  Screening for malignant neoplasm of colon (SNOMED CT 2516507456) performed by Jorge Tim MD on 7/20/2016 at 66 Years.  Comments:  8/1/2016 6:54 PM MARKT Estefani Fernandez MA result--negative  F/U:   3yrs   Social History:        Alcohol Assessment: Current            Current, Wine (5 oz), 1-2 times per year, Ready to change: No.                     Comments:                      07/07/2017 - Shivani Ayala                     Occasionally      Tobacco Assessment: Denies Tobacco Use            Never (less than 100 in lifetime)      Substance Abuse Assessment: Denies Substance Abuse            Never      Employment and Education Assessment            Retired      Home and Environment Assessment            Marital status: .  Spouse/Partner name: Yao Trujillo.  Injuries/Abuse/Neglect in household: No.  Feels               unsafe at home: No.  Family/Friends available for support: Yes.  Risks in environment: Stairs.      Nutrition and Health Assessment            Type of diet: Regular.  Wants to lose weight: Yes.  Sleeping concerns: No.  Feels highly stressed: No.      Exercise and Physical Activity Assessment: Does not exercise            Exercise frequency: Never.      Sexual Assessment            Identifies as female, Sexual orientation: Straight or  heterosexual.  History of STD: No.  History of sexual               abuse: No.        Physical Examination   Exam at Provider Discretion   Vital Signs   7/23/2020 8:39 AM CDT Temperature Tympanic 97.8 DegF  LOW    Peripheral Pulse Rate 84 bpm    HR Method Electronic    Systolic Blood Pressure 135 mmHg  HI    Diastolic Blood Pressure 81 mmHg  HI    Mean Arterial Pressure 99 mmHg    BP Method Electronic      Measurements from flowsheet : Measurements   7/23/2020 8:39 AM CDT Height Measured - Standard 64 in    Weight Measured - Standard 220.1 lb    BSA 2.12 m2    Body Mass Index 37.78 kg/m2  HI      General:  Alert and oriented, No acute distress.    Eye:  Pupils are equal, round and reactive to light, Extraocular movements are intact.    HENT:  Normocephalic, Tympanic membranes are clear, Normal hearing, Oral mucosa is moist.    Neck:  Supple, Non-tender, No carotid bruit, No jugular venous distention, No lymphadenopathy, No thyromegaly.    Respiratory:  Lungs are clear to auscultation, Respirations are non-labored, Breath sounds are equal.    Cardiovascular:  Normal rate, Regular rhythm, No murmur, Good pulses equal in all extremities, No edema.    Gastrointestinal:  Soft, Non-tender, Non-distended, Normal bowel sounds, No organomegaly.    Musculoskeletal:  Normal range of motion, Normal strength, degenerative changes noted.    Integumentary:  Warm, Dry.    Neurologic:  Alert, Oriented, Normal sensory, Normal motor function, No focal deficits, Cranial Nerves II-XII are grossly intact, Normal deep tendon reflexes, No signs of cognitive impairment..    Psychiatric:  Cooperative, Appropriate mood & affect, Normal judgment, Patient's PHQ9 and CAGE questionnaire reviewed and discussed with patient..       Review / Management   Results review:  INCLUDE PHQ 9 0.       Impression and Plan   Diagnosis     Encounter for Medicare annual wellness exam (GRH66-FI Z00.00).     Elevated cholesterol (FIT38-TS E78.00).      Hypertension (YLD89-VQ I10).     Chronic gout (LJN83-GN [id number].9XX0).     Course:  Progressing as expected.    Plan:  Please list plan for positive findings, treatment options, risk vs. benefits. none  Discussion regarding gout as above.  We will make no changes in her meds for hyperlipidemia or for her hypertension.  She is up-to-date on mammography and bone density.  She has mild osteopenia.  She has a occasional urge incontinence which is manageable.  .    Patient Instructions:       Counseled: Patient, Discussed preventative services including  Screening for AAA (Family history or male 65-70 who has smoked more than 100 cigarettes in a lifetime.), Lipid screening, Diabetes screening, Nutrition, Bone mass, Cancer screening (cervical, breast, colon), Immunizations (flu, Pneumovax, Hep. B, Zostavax), Glaucoma screening and ECG if needed.  Appropriate weight and diet discussed.  Discussed Advance Life Directives.    Preventative services checklist reviewed, updated and copy given to patient.  Please see scanned document.       .

## 2022-02-15 NOTE — PROGRESS NOTES
Patient:   JULIEN WU            MRN: [id number]            FIN: [id number]               Age:   68 years     Sex:  Female     :  1950   Associated Diagnoses:   Encounter for Medicare annual wellness exam; Elevated cholesterol; Hypertension   Author:   Jorge Tim MD      Visit Information      Date of Service: 2018 08:17 am  Performing Location: [facility]  Encounter#: [id number]      Primary Care Provider (PCP):  Jorge Tim MD, NPI# [id number]      Referring Provider:  Jorge Tim MD# [id number]   Visit type:  Annual exam.       Chief Complaint   2018 8:19 AM CDT    AWV     Medicare Initial / Annual Preventative Visit      Well Adult History   Well Adult History             The patient presents for well adult exam.  ADL's and Safety were reviewed.  ___none___ found.  Please see copy of scanned form.    I have reviewed with the patient the completed health risk assessment and health history form and we have agreed on the following plans for identified risk factors. ___none____ found.  Please see copy of scanned form.    Also fu htn and lipids  Meds as directed no problems   .        Review of Systems   Eye:  See Preventative Services Checklist for Vision/Glaucoma Test dates..    Ear/Nose/Mouth/Throat:  Negative, Hearing is evaluated..    Respiratory:  Negative.    Cardiovascular:  Negative.    Gastrointestinal:  Negative.    Genitourinary:  Negative.    Integumentary:  Negative.    Neurologic:  Negative.    Psychiatric:  PHQ-9 Noted.    See completed Health History for Review of Systems.      Health Status   Allergies:    Allergic Reactions (Selected)  Severity Not Documented  Amoxicillin (Rash and hand swelling)   Medications:  (Selected)   Prescriptions  Prescribed  atorvastatin 20 mg oral tablet: 1 tab(s) ( 20 mg ), po, daily, # 90 tab(s), 1 Refill(s), Type: Maintenance, Pharmacy: [id number], 1 tab(s) Oral daily  hydroCHLOROthiazide  25 mg oral tablet: 1 tab(s) ( 25 mg ), po, daily, # 90 tab(s), 1 Refill(s), Type: Maintenance, Pharmacy: [facility], 1 tab(s) Oral daily  lisinopril 10 mg oral tablet: 1 tab(s) ( 10 mg ), po, daily, # 90 tab(s), 1 Refill(s), Type: Maintenance, Pharmacy: [facility], 1 tab(s) Oral daily  Documented Medications  Documented  Fish Oil: po, 0 Refill(s), Type: Maintenance  Multiple Vitamins oral tablet: 1 tab(s), po, daily, 0 Refill(s), Type: Maintenance  Vitamin C: daily, 0 Refill(s), Type: Maintenance  garlic oral tablet: 0 Refill(s), Type: Maintenance,    Medications          *denotes recorded medication          *Vitamin C: daily.          atorvastatin 20 mg oral tablet: 20 mg, 1 tab(s), po, daily, 90 tab(s), 1 Refill(s).          *garlic oral tablet: 0 Refill(s), Type: Maintenance.          hydroCHLOROthiazide 25 mg oral tablet: 25 mg, 1 tab(s), po, daily, 90 tab(s), 1 Refill(s).          lisinopril 10 mg oral tablet: 10 mg, 1 tab(s), po, daily, 90 tab(s), 1 Refill(s).          *Multiple Vitamins oral tablet: 1 tab(s), po, daily.          *Fish Oil: po.     Problem list:    All Problems  Hypertension / SNOMED CT 86779881 / Confirmed  Elevated cholesterol / SNOMED CT 62997373 / Confirmed  Obesity / SNOMED CT 6425153807 / Probable  Degenerative arthritis of knee / SNOMED CT 362936189 / Confirmed  Seasonal allergies / SNOMED CT 578372270 / Confirmed      Histories   Past Medical History:    Active  Degenerative arthritis of knee (299553091)  Comments:  10/13/2014 CDT 9:12 AM CDT - Shivani Ayala  Bilateral  Elevated cholesterol (59133642)  Seasonal allergies (144872542)   Family History:    No family history items have been selected or recorded.   Procedure history:    Mammogram (SNOMED CT 966844426) on 8/3/2016 at 66 Years.  Comments:  8/4/2016 7:51 PM - Leni Cantu CMA  ACR 1 Negative; recommend annual mammograms  Screening for malignant neoplasm of colon (SNOMED CT 7134639138) performed by  Glenroy LANE, Jorge on 7/20/2016 at 66 Years.  Comments:  8/1/2016 6:54 PM - Misty GOTTLIEB, Estefani Lock result--negative  F/U:   3yrs   Social History:        Alcohol Assessment: Current            Current                     Comments:                      07/07/2017 - Shivani Ayala                     Occasionally      Tobacco Assessment: Denies Tobacco Use            Never      Substance Abuse Assessment: Denies Substance Abuse            Never      Employment and Education Assessment            Retired      Home and Environment Assessment            Marital status: .  Spouse/Partner name: Yao Trujillo.      Nutrition and Health Assessment            Type of diet: Regular.      Exercise and Physical Activity Assessment: Does not exercise            Exercise frequency: Does not exercise.        Physical Examination   Exam at Provider Discretion   Vital Signs   7/11/2018 8:56 AM CDT Peripheral Pulse Rate 73 bpm    Systolic Blood Pressure 125 mmHg    Diastolic Blood Pressure 83 mmHg  HI    Mean Arterial Pressure 97 mmHg    Vital Signs Comments Thigh Cuff Used   7/11/2018 8:19 AM CDT Temperature Tympanic 98.2 DegF    Peripheral Pulse Rate 82 bpm    HR Method Electronic    Systolic Blood Pressure 138 mmHg  HI    Diastolic Blood Pressure 85 mmHg  HI    Mean Arterial Pressure 103 mmHg    BP Method Electronic    Vital Signs Comments thigh cuff used      Measurements from flowsheet : Measurements   7/11/2018 8:19 AM CDT Height Measured - Standard 64 in    Weight Measured - Standard 231.2 lb    BSA 2.17 m2    Body Mass Index 39.68 kg/m2  HI      General:  Alert and oriented, No acute distress.    Eye:  Pupils are equal, round and reactive to light, Extraocular movements are intact.    HENT:  Normocephalic, Tympanic membranes are clear, Normal hearing, Oral mucosa is moist.    Neck:  Supple, Non-tender, No carotid bruit, No jugular venous distention, No lymphadenopathy, No thyromegaly.    Respiratory:  Lungs are  clear to auscultation, Respirations are non-labored, Breath sounds are equal.    Cardiovascular:  Normal rate, Regular rhythm, No murmur, Good pulses equal in all extremities, No edema.    Gastrointestinal:  Soft, Non-tender, Non-distended, Normal bowel sounds, No organomegaly.    Musculoskeletal:  Normal range of motion, Normal strength, degenerative changes noted.    Integumentary:  Warm, Dry.    Neurologic:  Alert, Oriented, Normal sensory, Normal motor function, No focal deficits, Cranial Nerves II-XII are grossly intact, Normal deep tendon reflexes, No signs of cognitive impairment..    Psychiatric:  Cooperative, Appropriate mood & affect, Normal judgment, Patient's PHQ9 and CAGE questionnaire reviewed and discussed with patient..       Review / Management   Results review:  INCLUDE PHQ 9 0.       Impression and Plan   Diagnosis     Encounter for Medicare annual wellness exam (EVH12-MI Z00.00).     Elevated cholesterol (UVV90-NC E78.00).     Hypertension (IBQ62-OM I10).     Course:  Progressing as expected.    Plan:  Please list plan for positive findings, treatment options, risk vs. benefits. none.    Patient Instructions:       Counseled: Patient, Discussed preventative services including  Screening for AAA (Family history or male 65-70 who has smoked more than 100 cigarettes in a lifetime.), Lipid screening, Diabetes screening, Nutrition, Bone mass, Cancer screening (cervical, breast, colon), Immunizations (flu, Pneumovax, Hep. B, Zostavax), Glaucoma screening and ECG if needed.  Appropriate weight and diet discussed.  Discussed Advance Life Directives.    Preventative services checklist reviewed, updated and copy given to patient.  Please see scanned document.       .

## 2022-02-15 NOTE — PROGRESS NOTES
Chief Complaint    renal consult per TFS  History of Present Illness       Walter is referred to me by Dr. Tim for evaluation of her chronic kidney disease and hypertension.  She shares having multiple acute gouty flares limited to her right great toe at the end of July and then in September.  She was on extended course of prednisone on both occasions.  These are her first known gout flares historically.  She has been maintained on lisinopril and hydrochlorothiazide for approximately 5 years.  Both were stopped in July at time of first gout episode.  She was changed over to Toprol-XL and amlodipine formulation.  Baseline serum creatinine of 1-1.1.  More recently with creatinine elevation to 1.2.  Denies any use of NSAIDs.  No family history of renal disease  Review of Systems      Constitutional:  No fever, No chills.        Eye:  Negative except as documented in history of present illness.        Ear/Nose/Mouth/Throat:  Negative except as documented in history of present illness.        Respiratory:  No shortness of breath.        Cardiovascular:  No chest pain, No palpitations, No peripheral edema, No syncope.        Gastrointestinal:  No nausea, No vomiting, No abdominal pain.        Genitourinary:  No dysuria, No hematuria, No change in urine stream.        Hematology/Lymphatics:  Negative except as documented in history of present illness.        Endocrine:  No excessive thirst, No polyuria.        Immunologic:  No recurrent fevers.        Musculoskeletal:  Back pain, Joint pain, No muscle pain.        Neurologic:  Alert and oriented X4, No numbness, No tingling, No headache.           Physical Exam   Vitals & Measurements    T: 98.3  F (Tympanic)  HR: 72 (Peripheral)  BP: 150/80     HT: 64 in  WT: 226 lb  BMI: 38.79       General:  Alert and oriented, No acute distress.        HENT:  Normocephalic, Oral mucosa is moist.        Neck:  Supple.        Respiratory:  Lungs are clear to auscultation,  Respirations are non-labored.        Cardiovascular:  Normal rate, Regular rhythm, No murmur, No edema.        Musculoskeletal:  Normal range of motion, Normal strength, No tenderness.        Neurologic:  Alert, Oriented, Normal motor function, No focal deficits.        Cognition and Speech:  Oriented, Speech clear and coherent.        Psychiatric:  Appropriate mood & affect.           Assessment/Plan       Chronic gout (M1A.9XX0)          Hopeful her frequency of acute flare is more related to thiazide use.         Uric acid level modestly elevated at 8.5 in September 2020         We will plan to treat acute flares only at this time, I do think short-term use of indomethacin would be reasonable and/or prednisone         If frequency of flares were to persist, then would consider allopurinol therapy with goal of maintaining uric acid level less than 5                Chronic kidney disease (CKD), stage III (moderate) (N18.3)          Baseline serum creatinine between 1-1.2, estimated GFR of approximately 45 to 50 mL/min.         Unclear etiology, though suspect mainly microvascular complications related to hypertension         Arrange for renal ultrasound         Check urinalysis, urine microalbumin/creatinine ratio, PTH, albumin, hemoglobin.         No nephrotic or nephritic features.  Assuming structurally unremarkable renal ultrasound and bland urinary sediment with no proteinuria, then I do not think it any further work-up is needed at this time         Ordered:          Albumin* ([facility]), Specimen Type: Serum, Collection Date: 10/14/20 9:37:00 CDT          Basic Metabolic Panel* ([facility]), Specimen Type: Serum, Collection Date: 10/14/20 9:38:00 CDT          Hemoglobin* ([facility]), Specimen Type: Blood, Collection Date: 10/14/20 9:37:00 CDT          Microalbumin, random urine (w/creatinine)* ([facility]), Specimen Type: Urine, Collection Date: 10/14/20 9:37:00 CDT          PTH, Intact without Calcium* ([facility]), Specimen  Type: Serum, Collection Date: 10/14/20 9:37:00 CDT          Urinalysis, Complete* ([facility]), Specimen Type: Urine, Collection Date: 10/14/20 9:37:00 CDT          US Renal (Request), ABN: Not Required, Chronic kidney disease (CKD), stage III (moderate)  Hypertension                Hypertension (I10)          Uncontrolled         Current antihypertensive regimen: Amlodipine 5 mg daily, Toprol-XL 25 mg daily         Regimen change: We will reintroduce lisinopril 10 mg daily         Intolerance: Hydrochlorothiazide (gout)         Blood pressure goal of less than 130/80         Ordered:          Albumin* ([facility]), Specimen Type: Serum, Collection Date: 10/14/20 9:37:00 CDT          Hemoglobin* ([facility]), Specimen Type: Blood, Collection Date: 10/14/20 9:37:00 CDT          Microalbumin, random urine (w/creatinine)* ([facility]), Specimen Type: Urine, Collection Date: 10/14/20 9:37:00 CDT          PTH, Intact without Calcium* ([facility]), Specimen Type: Serum, Collection Date: 10/14/20 9:37:00 CDT          Urinalysis, Complete* ([facility]), Specimen Type: Urine, Collection Date: 10/14/20 9:37:00 CDT          US Renal (Request), ABN: Not Required, Chronic kidney disease (CKD), stage III (moderate)  Hypertension                Orders:         indomethacin, = 1 cap(s) ( 25 mg ), Oral, tid, PRN: for gout pain, # 30 cap(s), 0 Refill(s), Type: Maintenance, Pharmacy: [facility], 1 cap(s) Oral tid,PRN:for gout pain, 64, in, 10/14/20 9:04:00 CDT, Height Measured, 226, lb, 10/14/20 9:04:00 CDT, Weigh..., (Ordered)         Return to Clinic (Request), Return in 2 weeks; f/u renal work-up  Patient Information     Name:BRYCE JULIEN VITALE      Address:      [address] [place] [id number]     Sex:Female     YOB: 1950     [phone]     Emergency Contact:CAROLE WU     MRN:[id number]     FIN:[id number]     Location:[facility]     Date of Service:10/14/2020      Primary Care Physician:        Jorge Tim MD, [phone]      Attending Physician:       Ghanshyam Cheney MD, [phone]  Problem List/Past Medical History    Ongoing     Chronic gout     Degenerative arthritis of knee       Comments: Bilateral     Elevated cholesterol     Hypertension     Obesity     Seasonal allergies    Historical     No qualifying data  Procedure/Surgical History     Screening for colon cancer (07/25/2019)      Comments: Cologuard resutl:  negative      Recommendation:  f/u 3yrs.     Mammogram (08/03/2016)      Comments: ACR 1 Negative; recommend annual mammograms.     Screening for malignant neoplasm of colon (07/20/2016)      Comments: Cologuard result--negative      F/U:   3yrs.  Medications    amLODIPine 5 mg oral tablet, 5 mg= 1 tab(s), Oral, daily    atorvastatin 20 mg oral tablet, 20 mg= 1 tab(s), Oral, daily, 1 refills    Fish Oil, Oral    garlic oral tablet    indomethacin 25 mg oral capsule, 25 mg= 1 cap(s), Oral, tid, PRN    lisinopril 10 mg oral tablet, 10 mg= 1 tab(s), Oral, daily, 1 refills    metoprolol succinate 25 mg oral tablet, extended release, 25 mg= 1 tab(s), Oral, daily, 1 refills    Multiple Vitamins oral tablet, 1 tab(s), Oral, daily    Vitamin C, daily  Allergies    amoxicillin (hand swelling, rash)  Social History    Smoking Status     Never smoker     Alcohol - Current      Wine (5 oz), 1-2 times per year, 1 drinks/episode average. 2 drinks/episode maximum. Ready to change: No.     Employment/School      Retired     Exercise - Does not exercise      Exercise frequency: Never.     Home/Environment      Marital status: . Spouse/Partner name: Yao Au. Living situation: Home/Independent. Injuries/Abuse/Neglect in household: No. Feels unsafe at home: No. Family/Friends available for support: Yes. Risks in environment: Stairs.     Nutrition/Health      Type of diet: Regular. Wants to lose weight: Yes. Sleeping concerns: No. Feels highly stressed: No.     Sexual      Sexually  active: Yes. Identifies as female, Sexual orientation: Straight or heterosexual. History of STD: No. History of sexual abuse: No.     Substance Abuse - Denies Substance Abuse      Never     Tobacco - Denies Tobacco Use      Never (less than 100 in lifetime)  Family History    Alzheimer's disease: Mother.    Heart disease: Father.    Hypertension: Mother.    Liver cancer: Mother.    Parkinson's disease: Mother.  Immunizations      Vaccine Date Status          influenza virus vaccine, inactivated 09/23/2020 Given          influenza virus vaccine, inactivated 10/11/2019 Given          influenza virus vaccine, inactivated 10/02/2018 Given          influenza virus vaccine, inactivated 10/18/2017 Given          pneumococcal (PPSV23) 07/07/2017 Given          influenza virus vaccine, inactivated 09/20/2016 Given          pneumococcal (PCV13) 07/06/2016 Given          influenza virus vaccine, inactivated 10/13/2015 Given          tetanus/diphth/pertuss (Tdap) adult/adol 12/03/2014 Given          influenza virus vaccine, inactivated 10/10/2014 Given  Lab Results          Lab Results (Last 4 results within 90 days)          Sodium Level: 133 mmol/L Low [135 mmol/L - 146 mmol/L] (09/16/20 08:33:00)          Sodium Level: 137 mmol/L [135 mmol/L - 146 mmol/L] (07/23/20 09:08:00)          Potassium Level: 4.6 mmol/L [3.5 mmol/L - 5.3 mmol/L] (09/16/20 08:33:00)          Potassium Level: 4.4 mmol/L [3.5 mmol/L - 5.3 mmol/L] (07/23/20 09:08:00)          Chloride Level: 94 mmol/L Low [98 mmol/L - 110 mmol/L] (09/16/20 08:33:00)          Chloride Level: 98 mmol/L [98 mmol/L - 110 mmol/L] (07/23/20 09:08:00)          CO2 Level: 31 mmol/L [20 mmol/L - 32 mmol/L] (09/16/20 08:33:00)          CO2 Level: 31 mmol/L [20 mmol/L - 32 mmol/L] (07/23/20 09:08:00)          Glucose Level: 100 mg/dL High [65 mg/dL - 99 mg/dL] (09/16/20 08:33:00)          Glucose Level: 118 mg/dL High [65 mg/dL - 99 mg/dL] (07/23/20 09:08:00)          BUN: 27 mg/dL  High [7 mg/dL - 25 mg/dL] (09/16/20 08:33:00)          BUN: 34 mg/dL High [7 mg/dL - 25 mg/dL] (07/23/20 09:08:00)          Creatinine Level: 1.2 mg/dL High [0.5 mg/dL - 0.99 mg/dL] (09/16/20 08:33:00)          Creatinine Level: 1.23 mg/dL High [0.5 mg/dL - 0.99 mg/dL] (07/23/20 09:08:00)          BUN/Creat Ratio: 23 High [6  - 22] (09/16/20 08:33:00)          BUN/Creat Ratio: 28 High [6  - 22] (07/23/20 09:08:00)          eGFR: 46 mL/min/1.73m2 Low (09/16/20 08:33:00)          eGFR: 45 mL/min/1.73m2 Low (07/23/20 09:08:00)          eGFR African American: 53 mL/min/1.73m2 Low (09/16/20 08:33:00)          eGFR African American: 52 mL/min/1.73m2 Low (07/23/20 09:08:00)          Calcium Level: 9.9 mg/dL [8.6 mg/dL - 10.4 mg/dL] (09/16/20 08:33:00)          Calcium Level: 9.9 mg/dL [8.6 mg/dL - 10.4 mg/dL] (07/23/20 09:08:00)          Uric Acid: 8.5 mg/dL High [2.5 mg/dL - 7 mg/dL] (09/16/20 08:33:00)          Cholesterol: 191 mg/dL (07/23/20 09:08:00)          Non-HDL Cholesterol: 135 High (07/23/20 09:08:00)          HDL: 56 mg/dL (07/23/20 09:08:00)          Cholesterol/HDL Ratio: 3.4 (07/23/20 09:08:00)          LDL: 105 High (07/23/20 09:08:00)          Triglyceride: 187 mg/dL High (07/23/20 09:08:00)

## 2022-02-15 NOTE — PROGRESS NOTES
Patient:   JULIEN WU            MRN: [id number]            FIN: [id number]               Age:   69 years     Sex:  Female     :  1950   Associated Diagnoses:   Hypertension; Elevated cholesterol; Obesity   Author:   Jorge Tim MD      Visit Information      Date of Service: 01/15/2020 09:19 am  Performing Location: [facility]  Encounter#: [id number]      Primary Care Provider (PCP):  Jorge Tim MD    NPI# [id number]      Referring Provider:  Jorge Tim MD# [id number]      Chief Complaint   1/15/2020 9:22 AM CST    HTN check up        History of Present Illness             The patient presents for follow-up evaluation of hypertension.  The quality of hypertension symptom(s) since the patient's last visit is described as decreased.  The context of the hypertension: blood pressure was maintained within the target range and better.  Associated symptoms consist of none.  Compliance problems: none.     Also follow up on hyperlipidemia taking meds as directed      Review of Systems   Constitutional:  Negative.    Eye:  Negative.    Ear/Nose/Mouth/Throat:  Negative.    Respiratory:  Negative.    Cardiovascular:  Negative.    Gastrointestinal:  Negative.    Genitourinary:  Negative.    Hematology/Lymphatics:  Negative.    Endocrine:  Negative.    Immunologic:  Negative.    Musculoskeletal:  Negative.    Integumentary:  Negative.    Neurologic:  Negative.       Health Status   Allergies:    Allergic Reactions (Selected)  Severity Not Documented  Amoxicillin (Rash and hand swelling)   Medications:  (Selected)   Prescriptions  Prescribed  atorvastatin 20 mg oral tablet: = 1 tab(s) ( 20 mg ), po, daily, # 90 tab(s), 1 Refill(s), Type: Maintenance, Pharmacy: [id number], 1 tab(s) Oral daily  hydroCHLOROthiazide 25 mg oral tablet: = 1 tab(s) ( 25 mg ), po, daily, # 90 tab(s), 1 Refill(s), Type: Maintenance, Pharmacy: [facility], 1 tab(s) Oral  daily  lisinopril 10 mg oral tablet: = 1 tab(s) ( 10 mg ), po, daily, # 90 tab(s), 1 Refill(s), Type: Maintenance, Pharmacy: [facility], 1 tab(s) Oral daily  Documented Medications  Documented  Fish Oil: po, 0 Refill(s), Type: Maintenance  Multiple Vitamins oral tablet: 1 tab(s), po, daily, 0 Refill(s), Type: Maintenance  Vitamin C: daily, 0 Refill(s), Type: Maintenance  garlic oral tablet: 0 Refill(s), Type: Maintenance,    Medications          *denotes recorded medication          *Vitamin C: daily.          atorvastatin 20 mg oral tablet: 20 mg, 1 tab(s), po, daily, 90 tab(s), 1 Refill(s).          *garlic oral tablet: 0 Refill(s), Type: Maintenance.          hydroCHLOROthiazide 25 mg oral tablet: 25 mg, 1 tab(s), po, daily, 90 tab(s), 1 Refill(s).          lisinopril 10 mg oral tablet: 10 mg, 1 tab(s), po, daily, 90 tab(s), 1 Refill(s).          *Multiple Vitamins oral tablet: 1 tab(s), po, daily.          *Fish Oil: po.       Problem list:    All Problems  Hypertension / SNOMED CT 62867086 / Confirmed  Elevated cholesterol / SNOMED CT 31734395 / Confirmed  Obesity / SNOMED CT 9315898053 / Probable  Degenerative arthritis of knee / SNOMED CT 113539921 / Confirmed  Seasonal allergies / SNOMED CT 705026013 / Confirmed      Histories   Past Medical History:    Active  Degenerative arthritis of knee (926612799)  Comments:  10/13/2014 CDT 9:12 AM CDT - Shivani Ayala  Bilateral  Elevated cholesterol (92009434)  Seasonal allergies (836314652)   Family History:    Liver cancer  Mother  Hypertension  Mother  Heart disease  Father  Parkinson's disease  Mother  Alzheimer's disease  Mother     Procedure history:    Screening for colon cancer (SNOMED CT 9481873775) performed by Jorge Tim MD on 7/25/2019 at 69 Years.  Comments:  8/5/2019 3:29 PM CDT - Estefani Jay MA resutl:  negative  Recommendation:  f/u 3yrs  Mammogram (SNOMED CT 036206339) on 8/3/2016 at 66 Years.  Comments:  8/4/2016 7:51  PM CDT - Pepe STRICKLAND Leni  ACR 1 Negative; recommend annual mammograms  Screening for malignant neoplasm of colon (SNOMED CT 9313833033) performed by Jorge Tim MD on 7/20/2016 at 66 Years.  Comments:  8/1/2016 6:54 PM CDT - Misty GOTTLIEB, Estefani Lock result--negative  F/U:   3yrs   Social History:        Alcohol Assessment: Current            Current, Wine (5 oz), 1-2 times per year, Ready to change: No.                     Comments:                      07/07/2017 - Shivani Ayala                     Occasionally      Tobacco Assessment: Denies Tobacco Use            Never (less than 100 in lifetime)      Substance Abuse Assessment: Denies Substance Abuse            Never      Employment and Education Assessment            Retired      Home and Environment Assessment            Marital status: .  Spouse/Partner name: Yao Trujillo.  Injuries/Abuse/Neglect in household: No.  Feels               unsafe at home: No.  Family/Friends available for support: Yes.  Risks in environment: Stairs.      Nutrition and Health Assessment            Type of diet: Regular.  Wants to lose weight: Yes.  Sleeping concerns: No.  Feels highly stressed: No.      Exercise and Physical Activity Assessment: Does not exercise            Exercise frequency: Never.      Sexual Assessment            Identifies as female, Sexual orientation: Straight or heterosexual.  History of STD: No.  History of sexual               abuse: No.        Physical Examination   Vital Signs   1/15/2020 9:22 AM CST Temperature Tympanic 99.2 DegF    Peripheral Pulse Rate 93 bpm    HR Method Electronic    Systolic Blood Pressure 134 mmHg  HI    Diastolic Blood Pressure 82 mmHg  HI    Mean Arterial Pressure 99 mmHg    BP Method Electronic      Measurements from flowsheet : Measurements   1/15/2020 9:22 AM CST Height Measured - Standard 64 in    Weight Measured - Standard 223.6 lb    BSA 2.14 m2    Body Mass Index 38.38 kg/m2  HI      General:   Alert and oriented, No acute distress.    Eye:  Pupils are equal, round and reactive to light, Extraocular movements are intact.    HENT:  No pharyngeal erythema.    Neck:  Supple, Non-tender.    Respiratory:  Lungs are clear to auscultation, Respirations are non-labored.    Cardiovascular:  Normal rate, Regular rhythm.    Gastrointestinal:  Soft, Non-tender, Normal bowel sounds.    Musculoskeletal:  degenerative changes noted.    Integumentary:  No rash.    Neurologic:  Alert, Oriented, Cranial Nerves II-XII are grossly intact.       Health Maintenance      Recommendations     Pending (in the next year)        OverDue           Type 2 Diabetes Mellitus Screen (Female) due  11/05/15  and every 1  year(s)           Alcohol Misuse Screen (Female) due  07/07/18  and every 1  year(s)           Depression Screen (Female) due  07/07/18  and every 1  year(s)        Due            Aspirin Therapy for Prevention of CVD (Female) due  01/15/20  and every 5  year(s)           Fall Risk Screen (Female) due  01/15/20  and every 1  year(s)           Hepatitis C Screen 9470-6953 (Female) due  01/15/20  One-time only           Lung Cancer Screen (Female) due  01/15/20  and every 1  year(s)           Zoster/Shingles Vaccine due  01/15/20  One-time only        Due In Future            Lipid Disorders Screen (Female) not due until  07/12/20  and every 1  year(s)           Osteoporosis Screen not due until  08/14/20  and every 2  year(s)           Breast Cancer Screen not due until  08/15/20  and every 1  year(s)           Influenza Vaccine not due until  08/31/20  and every 1  year(s)     Satisfied (in the past 1 year)        Satisfied            Body Mass Index Check (Female) on  01/15/20.           Body Mass Index Check (Female) on  10/04/19.           Body Mass Index Check (Female) on  07/12/19.           Breast Cancer Screen on  08/15/19.           Breast Cancer Screen on  08/15/19.           Colorectal Cancer Screen (Colonoscopy)  (Female) on  07/25/19.           Colorectal Cancer Screen (Occult Blood) (Female) on  07/25/19.           Colorectal Cancer Screen (Occult Blood) (Female) on  07/25/19.           Colorectal Cancer Screen (Sigmoidoscopy) (Female) on  07/25/19.           High Blood Pressure Screen (Female) on  01/15/20.           High Blood Pressure Screen (Female) on  10/04/19.           High Blood Pressure Screen (Female) on  07/12/19.           High Blood Pressure Screen (Female) on  07/12/19.           High Blood Pressure Screen (Female) on  01/16/19.           Influenza Vaccine on  10/11/19.           Lipid Disorders Screen (Female) on  07/12/19.           Lipid Disorders Screen (Female) on  07/12/19.           Lipid Disorders Screen (Female) on  07/12/19.           Lipid Disorders Screen (Female) on  07/12/19.           Obesity Screen and Counseling (Female) on  01/15/20.           Obesity Screen and Counseling (Female) on  10/04/19.           Obesity Screen and Counseling (Female) on  07/12/19.           Obesity Screen and Counseling (Female) on  01/16/19.           Tobacco Use Screen (Female) on  01/15/20.           Tobacco Use Screen (Female) on  10/04/19.           Tobacco Use Screen (Female) on  07/12/19.           Tobacco Use Screen (Female) on  01/16/19.        Postponed            Colorectal Cancer Screen (Colonoscopy) (Female) until  08/05/19.          Impression and Plan   Diagnosis     Hypertension (LWY13-BT I10).     Elevated cholesterol (SQS40-IF E78.00).     Obesity (DUP88-LR E66.9).     Course:  Progressing as expected.    Plan:  fu 6 mo, BMI,Weight loss,exercise,diet discussed.    Patient Instructions:       Counseled: Patient, Regarding diagnosis, Regarding treatment, Regarding medications.

## 2022-02-15 NOTE — NURSING NOTE
Medicare Visit Entered On:  7/12/2019 9:21 AM CDT    Performed On:  7/12/2019 9:13 AM CDT by Stephanie Regan               Summary   Chief Complaint :   AWV   Advance Directive :   Yes   Weight Measured :   221.8 lb(Converted to: 221 lb 13 oz, 100.61 kg)    Height Measured :   64 in(Converted to: 5 ft 4 in, 162.56 cm)    Body Mass Index :   38.07 kg/m2 (HI)    Body Surface Area :   2.13 m2   Systolic Blood Pressure :   137 mmHg (HI)    Diastolic Blood Pressure :   85 mmHg (HI)    Mean Arterial Pressure :   102 mmHg   Peripheral Pulse Rate :   76 bpm   BP Method :   Electronic   HR Method :   Electronic   Temperature Tympanic :   97.6 DegF(Converted to: 36.4 DegC)  (LOW)    Stephanie Regan - 7/12/2019 9:13 AM CDT   Health Status   Allergies Verified? :   Yes   Medication History Verified? :   Yes   Pre-Visit Planning Status :   Completed   Tobacco Use? :   Never smoker   Stephanie Regan - 7/12/2019 9:13 AM CDT   Geriatric Depression Screening   Geriatric Depression Satisfied Life :   Yes   Geriatric Depression Dropped Activities :   No   Geriatric Depression Life Empty :   No   Geriatric Depression Bored :   No   Geriatric Depression Good Spirits :   Yes   Geriatric Depression Afraid Bad Things :   No   Geriatric Depression Feel Happy :   Yes   Geriatric Depression Feel Helpless :   No   Geriatric Depression Prefer to Stay Home :   No   Geriatric Depression Memory Problems :   No   Geriatric Depression Wonderful Be Alive :   Yes   Geriatric Depression Feel Worthless :   No   Geriatric Depression Situation Hopeless :   No   Geriatric Depression Others Better Off :   No   Geriatric Depression Full of Energy :   Yes   Geriatric Depression Total Score :   0    Stephanie Regan - 7/12/2019 9:13 AM CDT   Home Safety Screen   Emergency Numbers Kept/Updated :   Yes   Aware of Smoking Dangers :   Yes   Smoke Alarms/Fire Extinguisher Available :   Yes   Household Members Fire Safety Knowledge :   Yes   Floor Rugs  Removed or Fastened :   Yes   Mats in Bathtub/Shower :   Yes   Stairway Rails or Banisters :   Yes   Outdoor Clutter Safety :   Yes   Indoor Clutter Safety :   Yes   Electrical Cord Safety :   Yes   Stephanie Regan - 7/12/2019 9:13 AM CDT   Salter Fall Risk   History of Fall in Last 3 Months Salter :   No   Stephanie Regan - 7/12/2019 9:13 AM CDT   Functional Assessment   Focused Functional Assessment Grid   Bathing :   Independent (2)   Dressing :   Independent (2)   Toileting :   Independent (2)   Transferring Bed or Chair :   Independent (2)   Continence :   Independent (2)   Feeding :   Independent (2)   Stephanie Regan - 7/12/2019 9:13 AM CDT   ADL Index Score :   12    Capable of Shopping :   Yes   Capable of Walking :   Yes   Capable of Housekeeping :   Yes   Capable of Managing Medications :   Yes   Capable of Handling Finances :   Yes   Stephanie Regan - 7/12/2019 9:13 AM CDT

## 2022-02-24 ENCOUNTER — COMMUNICATION - RIVER FALLS (OUTPATIENT)
Dept: FAMILY MEDICINE | Facility: CLINIC | Age: 72
End: 2022-02-24

## 2022-03-02 VITALS
SYSTOLIC BLOOD PRESSURE: 160 MMHG | HEART RATE: 81 BPM | BODY MASS INDEX: 38.62 KG/M2 | DIASTOLIC BLOOD PRESSURE: 91 MMHG | WEIGHT: 225 LBS

## 2022-03-02 NOTE — PROGRESS NOTES
Patient:   JULIEN WU            MRN: [id number]            FIN: [id number]               Age:   71 years     Sex:  Female     :  1950   Associated Diagnoses:   Chronic gout; Hypertensive renal failure; Elevated cholesterol; Localized osteoarthrosis of right hip; Degenerative arthritis of knee; Nocturia   Author:   Jorge Tim MD      Visit Information      Date of Service: 2022 09:56 am  Performing Location: [facility]  Encounter#: [id number]      Primary Care Provider (PCP):  Jorge Tim MD    NPI# [id number]      Referring Provider:  Jorge Tim MD# [id number]      Chief Complaint   2022 10:04 AM CST   HTN/HLD f/u and refills. Concerns with R leg bothering/sore.      History of Present Illness   Patient is here for 6-month follow-up.  She is struggling with some pain in her right hip for the last month.  Sometimes it bothers her at night.  She has pain in her right lateral leg as well.  No flares of gout.  Blood pressures are usually 130s over 80s at home           Review of Systems   Constitutional:  Negative.    Eye:  Negative.    Ear/Nose/Mouth/Throat:  Negative.    Respiratory:  Negative.    Cardiovascular:  Negative.    Gastrointestinal:  Negative.    Genitourinary:  Negative.    Hematology/Lymphatics:  Negative.    Endocrine:  Negative.    Immunologic:  Negative.    Musculoskeletal:  Negative except as documented in history of present illness.    Integumentary:  Negative.    Neurologic:  Negative.       Health Status   Allergies:    Allergic Reactions (Selected)  Severity Not Documented  Amoxicillin (Rash and hand swelling)   Medications:  (Selected)   Prescriptions  Prescribed  amLODIPine 5 mg oral tablet: = 1 tab(s), Oral, daily, # 90 tab(s), 1 Refill(s), Type: Maintenance, Pharmacy: [id number], 1 tab(s) Oral daily, 64, in, 21 9:52:00 CDT, Height Measured, 227, lb, 21 9:52:00 CDT, Weight Measured  atorvastatin 20  mg oral tablet: = 1 tab(s) ( 20 mg ), po, daily, # 90 tab(s), 1 Refill(s), Type: Maintenance, Pharmacy: [facility], 1 tab(s) Oral daily, 64, in, 07/28/21 9:52:00 CDT, Height Measured, 227, lb, 07/28/21 9:52:00 CDT, Weight Measured  indomethacin 25 mg oral capsule: = 1 cap(s) ( 25 mg ), Oral, tid, PRN: for gout pain, # 30 cap(s), 0 Refill(s), Type: Maintenance, Pharmacy: [facility], 1 cap(s) Oral tid,PRN:for gout pain, 64, in, 10/14/20 9:04:00 CDT, Height Measured, 226, lb, 10/14/20 9:04:00 CDT, Weigh...  lisinopril 20 mg oral tablet: = 1 tab(s), Oral, daily, # 90 tab(s), 1 Refill(s), Type: Maintenance, Pharmacy: [facility], 1 tab(s) Oral daily, 64, in, 07/28/21 9:52:00 CDT, Height Measured, 227, lb, 07/28/21 9:52:00 CDT, Weight Measured  metoprolol succinate 50 mg oral tablet, extended release: = 1 tab(s) ( 50 mg ), Oral, daily, # 90 tab(s), 1 Refill(s), Type: Maintenance, Pharmacy: [facility], 1 tab(s) Oral daily, 64, in, 07/28/21 9:52:00 CDT, Height Measured, 225, lb, 01/14/22 10:04:00 CST, Weight Measured  oxybutynin 5 mg/24 hours oral tablet, extended release: = 1 tab(s) ( 5 mg ), Oral, daily, # 90 tab(s), 3 Refill(s), Type: Maintenance, Pharmacy: [place] [id number], 1 tab(s) Oral daily, 64, in, 01/13/21 9:48:00 CST, Height Measured, 225.8, lb, 01/13/21 9:36:00 CST, Weight Measured  Documented Medications  Documented  Fish Oil: po, 0 Refill(s), Type: Maintenance  Multiple Vitamins oral tablet: 1 tab(s), po, daily, 0 Refill(s), Type: Maintenance  Vitamin C: daily, 0 Refill(s), Type: Maintenance  Vitamin D3: daily, 0 Refill(s), Type: Maintenance  calcium (as carbonate) 600 mg oral tablet: = 1 tab(s) ( 600 mg ), Oral, daily, 0 Refill(s), Type: Maintenance  garlic oral tablet: 0 Refill(s), Type: Maintenance,    Medications          *denotes recorded medication          amLODIPine 5 mg oral tablet: 1 tab(s), Oral, daily, 90 tab(s), 1 Refill(s).          *Vitamin C:  daily.          atorvastatin 20 mg oral tablet: 20 mg, 1 tab(s), po, daily, 90 tab(s), 1 Refill(s).          *calcium (as carbonate) 600 mg oral tablet: 600 mg, 1 tab(s), Oral, daily, 0 Refill(s).          *Vitamin D3: daily, 0 Refill(s).          *garlic oral tablet: 0 Refill(s), Type: Maintenance.          indomethacin 25 mg oral capsule: 25 mg, 1 cap(s), Oral, tid, PRN: for gout pain, 30 cap(s), 0 Refill(s).          lisinopril 20 mg oral tablet: 1 tab(s), Oral, daily, 90 tab(s), 1 Refill(s).          metoprolol succinate 50 mg oral tablet, extended release: 50 mg, 1 tab(s), Oral, daily, 90 tab(s), 1 Refill(s).          *Multiple Vitamins oral tablet: 1 tab(s), po, daily.          *Fish Oil: po.          oxybutynin 5 mg/24 hours oral tablet, extended release: 5 mg, 1 tab(s), Oral, daily, 90 tab(s), 3 Refill(s).       Problem list:    All Problems  Obesity / SNOMED CT 8647059691 / Probable  Degenerative arthritis of knee / SNOMED CT 538616032 / Confirmed  Elevated cholesterol / SNOMED CT 38544560 / Confirmed  Hypertension / SNOMED CT 85089939 / Confirmed  Seasonal allergies / SNOMED CT 622795423 / Confirmed  Chronic gout / SNOMED CT 379542018 / Confirmed      Histories   Past Medical History:    Active  Obesity (9557663105)  Degenerative arthritis of knee (715191109)  Comments:  10/13/2014 CDT 9:12 AM CDT - Shivani Ayala  Bilateral  Elevated cholesterol (83986682)  Hypertension (65261770)  Seasonal allergies (163219105)  Chronic gout (560448165)   Family History:    Liver cancer  Mother  Hypertension  Mother  Heart disease  Father  Parkinson's disease  Mother  Hyperlipidemia  Mother  Alzheimer's disease  Mother     Procedure history:    Screening for colon cancer (SNOMED CT 6106730476) performed by Jorge Tim MD on 7/25/2019 at 69 Years.  Comments:  8/5/2019 3:29 PM CDT - Feyereisen MA, Estefani  Cologuard resutl:  negative  Recommendation:  f/u 3yrs  [facility] on 8/3/2016 at 66  Years.  Comments:  8/4/2016 7:51 PM CDT - Pepe STRICKLAND, Leni  ACR 1 Negative; recommend annual mammograms  Screening for malignant neoplasm of colon (SNOMED CT 7573049497) performed by Jorge Tim MD on 7/20/2016 at 66 Years.  Comments:  8/1/2016 6:54 PM CDT - Misty GOTTLIEB, Estefani  Cologuard result--negative  F/U:   3yrs   Social History:        Electronic Cigarette/Vaping Assessment            Electronic Cigarette Use: Never.            Electronic Cigarette Use: Never.      Alcohol Assessment: Current            Wine (5 oz), 1-2 times per year, 1 drinks/episode average.  2 drinks/episode maximum.  Ready to change: No.                     Comments:                      07/07/2017 - Shivani Ayala                     Occasionally      Tobacco Assessment            Never (less than 100 in lifetime)            Never (less than 100 in lifetime)      Substance Abuse Assessment: Denies Substance Abuse            Never      Employment and Education Assessment            Retired      Home and Environment Assessment            Marital status: .  Spouse/Partner name: Yao Au.  Living situation: Home/Independent.               Injuries/Abuse/Neglect in household: No.  Feels unsafe at home: No.  Family/Friends available for support:               Yes.  Risks in environment: Stairs.      Nutrition and Health Assessment            Type of diet: Regular.  Wants to lose weight: Yes.  Sleeping concerns: No.  Feels highly stressed: No.      Exercise and Physical Activity Assessment: Does not exercise            Exercise frequency: Never.      Sexual Assessment            Sexually active: Yes.  Identifies as female, Sexual orientation: Straight or heterosexual.  History of STD:               No.  History of sexual abuse: No.        Physical Examination   Vital Signs   1/14/2022 10:04 AM CST Peripheral Pulse Rate 81 bpm    Pulse Site Radial artery    HR Method Electronic    Systolic Blood Pressure 160 mmHg  HI     Diastolic Blood Pressure 91 mmHg  HI    Mean Arterial Pressure 114 mmHg    BP Site Right arm    BP Method Electronic      Measurements from flowsheet : Measurements   1/14/2022 10:04 AM CST   Weight Measured - Standard                225 lb     General:  Alert and oriented, No acute distress.    Eye:  Pupils are equal, round and reactive to light, Extraocular movements are intact.    HENT:  Normocephalic.    Neck:  Supple, Non-tender, No carotid bruit.    Respiratory:  Lungs are clear to auscultation, Respirations are non-labored.    Cardiovascular:  Normal rate, Regular rhythm, No edema.    Gastrointestinal:  Soft, Non-tender.    Musculoskeletal:       Lower extremity exam: Hip ( Right, Range of motion ( Diminished ) ).    Integumentary:  No rash.    Neurologic:  Alert, Oriented, Cranial Nerves II-XII are grossly intact.       Health Maintenance      Recommendations     Pending (in the next year)        OverDue           Type 2 Diabetes Mellitus Screen due  11/05/15  and every 1  year(s)           Alcohol Misuse Screen due  07/23/21  and every 1  year(s)           Fall Risk Screen due  07/23/21  and every 1  year(s)        Due            Tobacco Use Screen due  01/13/22  and every 1  year(s)           Hepatitis C Screen [id number] due  01/14/22  One-time only        Postponed            Colorectal Cancer Screen (Colonoscopy) due  08/04/22  and every 3  year(s)        Due In Future            Colorectal Cancer Screen (Occult Blood) not due until  07/25/22  and every 3  year(s)           Body Mass Index Check not due until  07/28/22  and every 1  year(s)           Depression Screen not due until  07/28/22  and every 1  year(s)           Lipid Disorders Screen not due until  07/28/22  and every 1  year(s)           Influenza Vaccine not due until  09/01/22  and every 1  year(s)     Satisfied (in the past 1 year)        Satisfied            Body Mass Index Check on  07/28/21.           Breast Cancer Screen on   08/12/21.           Breast Cancer Screen on  08/12/21.           COVID-19 Vaccine (Moderna) Dose 3 Booster on  12/02/21.           COVID-19 Vaccine (Moderna) Dose 2 on  04/09/21.           COVID-19 Vaccine (Moderna) Dose 1 on  03/12/21.           Coronavirus (COVID-19) Vaccine (Moderna) on  12/02/21.           Depression Screen on  07/28/21.           High Blood Pressure Screen on  01/14/22.           High Blood Pressure Screen on  07/28/21.           Influenza Vaccine on  09/21/21.           Influenza Vaccine on  09/21/21.           Lipid Disorders Screen on  07/28/21.           Lipid Disorders Screen on  07/28/21.           Lipid Disorders Screen on  07/28/21.           Lipid Disorders Screen on  07/28/21.           Obesity Screen and Counseling on  01/14/22.           Obesity Screen and Counseling on  07/28/21.           Osteoporosis Screen on  08/12/21.          Impression and Plan   Diagnosis     Chronic gout (PZT57-RU M1A.9XX0).     Hypertensive renal failure (CGC24-XS I12.9).     Elevated cholesterol (PJC69-XJ E78.00).     Localized osteoarthrosis of right hip (HRH98-ZO M16.11).     Degenerative arthritis of knee (PUU11-PA M17.9).     Nocturia (THL90-QX R35.1).     Course:  Progressing as expected.    Plan:  1.  Hypertension suboptimally controlled we will increase metoprolol continue with amlodipine and lisinopril  2.  Osteoarthritis right hip we will look at prednisone taper she declined Ortho referral for now  3.  Osteoarthritis of right knee  Before nocturia controlled with Detrol  5.  Hyperlipidemia with abnormal cardiac calcium score on atorvastatin  6.  History of gout no flares off of diuretics  7.  Obesity  .    Patient Instructions:       Counseled: Patient, Regarding diagnosis, Regarding treatment, Regarding medications.

## 2022-03-02 NOTE — TELEPHONE ENCOUNTER
---------------------  From: Mecca Murphy RN (Phone Messages Pool ([id number]_[facility]))   To: [facility] Message Pool ([id number]_[place] [facility] [place]);     Sent: 2/24/2022 10:04:07 AM CST  Subject: ortho questions       PCP: CANDACE      Time of Call: 9:43am       Person Calling:  pt  Phone number:  [phone]    Note:   VM received from pt, stating TFS told her to call when she is ready for an referral to Ortho. Stated she is ready. Requested return call to discuss a few questions about her hip.      Last office visit and reason:  1/14/22 HTN TFSSpoke to pt. Referral placed. She had questions in reqards to her osteopenia but I told her that ortho would have a better idea once and if a surgery discussion was started. She agreed with plan.

## 2022-03-02 NOTE — NURSING NOTE
Comprehensive Intake Entered On:  2022 10:11 AM CST    Performed On:  2022 10:04 AM CST by Cori Quinn CMA               Summary   Chief Complaint :   HTN/HLD f/u and refills. Concerns with R leg bothering/sore.    Advance Directive :   Yes   Weight Measured :   225 lb(Converted to: 225 lb 0 oz, 102.058 kg)    Systolic Blood Pressure :   160 mmHg (HI)    Diastolic Blood Pressure :   91 mmHg (HI)    Mean Arterial Pressure :   114 mmHg   Peripheral Pulse Rate :   81 bpm   BP Site :   Right arm   Pulse Site :   Radial artery   BP Method :   Electronic   HR Method :   Electronic   Cori Quinn CMA - 2022 10:04 AM CST   Health Status   Allergies Verified? :   Yes   Medication History Verified? :   Yes   Pre-Visit Planning Status :   Completed   Cori Quinn CMA - 2022 10:04 AM CST   Demographics   Last Name :   Angely   Address :   [address] [place] [address]   First Name :   Walter Ortiz Initial :   SHAYNE   Responsible Party Date of Birth () :   1950 CDT   City :   [place]   State :   [place]   Zip Code :   [address]   Contact Relationship to Patient (other) :   Patient   [place] Cori STRICKLAND - 2022 10:04 AM CST   Providers Grid   Provider Name :    Stas Rivas MD              Provider Specialty :    Family Practice              Comments :    [facility]                Cori Quinn CMA - 2022 10:04 AM CST         Ancillary Services Grid   Name :    [facility]        Type of Service :       Pharmacy             Cori Quinn CMA - 2022 10:04 AM CST  Cori Quinn CMA - 2022 10:04 AM CST  Cori Quinn CMA - 2022 10:04 AM CST       Consents   Consent for Immunization Exchange :   Consent Granted   Consent for Immunizations to Providers :   Consent Granted   Cori Quinn CMA - 2022 10:04 AM CST   Meds / Allergies   (As Of: 2022 10:11:30 AM CST)   Allergies (Active)   amoxicillin   Estimated Onset Date:   Unspecified ; Reactions:   rash, hand swelling ; Created By:   Marry Montalvo RN; Reaction Status:   Active ; Category:   Drug ; Substance:   amoxicillin ; Type:   Allergy ; Updated By:   Marry Montalvo RN; Reviewed Date:   7/28/2021 10:19 AM CDT        Medication List   (As Of: 1/14/2022 10:11:30 AM CST)   Prescription/Discharge Order    amLODIPine  :   amLODIPine ; Status:   Prescribed ; Ordered As Mnemonic:   amLODIPine 5 mg oral tablet ; Simple Display Line:   1 tab(s), Oral, daily, 90 tab(s), 1 Refill(s) ; Ordering Provider:   Jorge Tim MD; Catalog Code:   amLODIPine ; Order Dt/Tm:   7/28/2021 10:32:10 AM CDT          atorvastatin  :   atorvastatin ; Status:   Prescribed ; Ordered As Mnemonic:   atorvastatin 20 mg oral tablet ; Simple Display Line:   20 mg, 1 tab(s), po, daily, 90 tab(s), 1 Refill(s) ; Ordering Provider:   Jorge Tim MD; Catalog Code:   atorvastatin ; Order Dt/Tm:   7/28/2021 10:30:32 AM CDT          indomethacin  :   indomethacin ; Status:   Prescribed ; Ordered As Mnemonic:   indomethacin 25 mg oral capsule ; Simple Display Line:   25 mg, 1 cap(s), Oral, tid, PRN: for gout pain, 30 cap(s), 0 Refill(s) ; Ordering Provider:   Ghanshyam Cheney MD; Catalog Code:   indomethacin ; Order Dt/Tm:   10/14/2020 9:32:40 AM CDT          lisinopril  :   lisinopril ; Status:   Prescribed ; Ordered As Mnemonic:   lisinopril 20 mg oral tablet ; Simple Display Line:   1 tab(s), Oral, daily, 90 tab(s), 1 Refill(s) ; Ordering Provider:   Jorge Tim MD; Catalog Code:   lisinopril ; Order Dt/Tm:   7/28/2021 10:31:45 AM CDT          metoprolol  :   metoprolol ; Status:   Prescribed ; Ordered As Mnemonic:   metoprolol succinate 25 mg oral tablet, extended release ; Simple Display Line:   25 mg, 1 tab(s), Oral, daily, 90 tab(s), 1 Refill(s) ; Ordering Provider:   Jorge Tim MD; Catalog Code:   metoprolol ; Order Dt/Tm:   7/28/2021 10:31:12 AM CDT           oxybutynin  :   oxybutynin ; Status:   Prescribed ; Ordered As Mnemonic:   oxybutynin 5 mg/24 hours oral tablet, extended release ; Simple Display Line:   5 mg, 1 tab(s), Oral, daily, 90 tab(s), 3 Refill(s) ; Ordering Provider:   Jorge Tim MD; Catalog Code:   oxybutynin ; Order Dt/Tm:   1/19/2021 10:15:53 AM CST            Home Meds    ascorbic acid  :   ascorbic acid ; Status:   Documented ; Ordered As Mnemonic:   Vitamin C ; Simple Display Line:   daily ; Catalog Code:   ascorbic acid ; Order Dt/Tm:   10/10/2014 8:57:56 AM CDT          calcium carbonate  :   calcium carbonate ; Status:   Documented ; Ordered As Mnemonic:   calcium (as carbonate) 600 mg oral tablet ; Simple Display Line:   600 mg, 1 tab(s), Oral, daily, 0 Refill(s) ; Catalog Code:   calcium carbonate ; Order Dt/Tm:   1/14/2022 10:09:44 AM CST          cholecalciferol  :   cholecalciferol ; Status:   Documented ; Ordered As Mnemonic:   Vitamin D3 ; Simple Display Line:   daily, 0 Refill(s) ; Catalog Code:   cholecalciferol ; Order Dt/Tm:   1/14/2022 10:09:32 AM CST          garlic  :   garlic ; Status:   Documented ; Ordered As Mnemonic:   garlic oral tablet ; Catalog Code:   garlic ; Order Dt/Tm:   10/27/2014 8:57:13 AM CDT          multivitamin  :   multivitamin ; Status:   Documented ; Ordered As Mnemonic:   Multiple Vitamins oral tablet ; Simple Display Line:   1 tab(s), po, daily ; Catalog Code:   multivitamin ; Order Dt/Tm:   10/10/2014 8:57:49 AM CDT          omega-3 polyunsaturated fatty acids  :   omega-3 polyunsaturated fatty acids ; Status:   Documented ; Ordered As Mnemonic:   Fish Oil ; Simple Display Line:   po ; Catalog Code:   omega-3 polyunsaturated fatty acids ; Order Dt/Tm:   10/27/2014 8:57:25 AM CDT            Social History   Social History   (As Of: 1/14/2022 10:11:30 AM CST)   Alcohol:  Current      Wine (5 oz), 1-2 times per year, 1 drinks/episode average.  2 drinks/episode maximum.  Ready to change: No.    Comments:  7/7/2017 10:45 AM - Shivani Ayala: Occasionally   (Last Updated: 8/6/2021 2:38:58 PM CDT by Tia Santiago)          Tobacco:        Never (less than 100 in lifetime)   (Last Updated: 1/13/2021 9:36:48 AM CST by Stephanie Regan)   Never (less than 100 in lifetime)   (Last Updated: 1/14/2022 10:05:19 AM CST by Cori Quinn CMA)          Electronic Cigarette/Vaping:        Electronic Cigarette Use: Never.   (Last Updated: 1/13/2021 9:36:51 AM CST by Stephanie Regan)   Electronic Cigarette Use: Never.   (Last Updated: 1/14/2022 10:05:23 AM CST by Cori Quinn CMA)          Substance Abuse:  Denies Substance Abuse      Never   (Last Updated: 10/10/2014 3:12:10 PM CDT by Stephanie Regan)          Employment/School:        Retired   (Last Updated: 10/10/2014 3:11:47 PM CDT by Stephanie Regan)          Home/Environment:        Marital status: .  Spouse/Partner name: Yao Au.  Living situation: Home/Independent.  Injuries/Abuse/Neglect in household: No.  Feels unsafe at home: No.  Family/Friends available for support: Yes.  Risks in environment: Stairs.   (Last Updated: 8/7/2020 9:56:28 AM CDT by Margaret Lynne)          Nutrition/Health:        Type of diet: Regular.  Wants to lose weight: Yes.  Sleeping concerns: No.  Feels highly stressed: No.   (Last Updated: 7/12/2019 1:59:34 PM CDT by Heather Rutherford)          Exercise:  Does not exercise      Exercise frequency: Never.   (Last Updated: 7/12/2019 1:59:51 PM CDT by Heather Rutherford)          Sexual:        Sexually active: Yes.  Identifies as female, Sexual orientation: Straight or heterosexual.  History of STD: No.  History of sexual abuse: No.   (Last Updated: 8/7/2020 9:57:48 AM CDT by Margaret Lynne)

## 2022-03-03 ENCOUNTER — TELEPHONE (OUTPATIENT)
Dept: FAMILY MEDICINE | Facility: CLINIC | Age: 72
End: 2022-03-03

## 2022-03-03 DIAGNOSIS — M16.11 LOCALIZED OSTEOARTHROSIS OF RIGHT HIP: Primary | ICD-10-CM

## 2022-03-03 NOTE — TELEPHONE ENCOUNTER
Reason for Call:  Referral Ortho    Detailed comments: Patient is wanting to be referred for Ortho for her hip. She would like to see one that comes to [place]    Phone Number Patient can be reached at: Cell number on file:    Telephone Information:   Mobile [phone]       Best Time: anytime    Can we leave a detailed message on this number? YES    Call taken on 3/3/2022 at 10:30 AM by Nicolette Mchperson

## 2022-03-04 NOTE — TELEPHONE ENCOUNTER
Patient called back needing to know the name of the provider that comes to [place] or [place]. Can be reached at [phone]

## 2022-03-09 NOTE — TELEPHONE ENCOUNTER
Called patient to inform her that we do not have an ortho provider that comes to [place] from [place].  I called [facility] to make sure.  I was given the names of providers that go to [facility] in [place], but she was not interested.  She stated she will go through her insurance company and find a Ortho Hip provider that route.

## 2022-04-30 PROBLEM — J30.2 SEASONAL ALLERGIC RHINITIS: Status: ACTIVE | Noted: 2022-04-30

## 2022-04-30 PROBLEM — M17.9 OSTEOARTHRITIS OF KNEE: Status: ACTIVE | Noted: 2022-04-30

## 2022-04-30 PROBLEM — E66.9 OBESITY: Status: ACTIVE | Noted: 2022-04-30

## 2022-04-30 PROBLEM — E78.00 HYPERCHOLESTEROLEMIA: Status: ACTIVE | Noted: 2022-04-30

## 2022-04-30 PROBLEM — R35.1 NOCTURIA: Status: ACTIVE | Noted: 2022-04-30

## 2022-04-30 PROBLEM — I10 PRIMARY HYPERTENSION: Status: ACTIVE | Noted: 2022-04-30

## 2022-04-30 PROBLEM — M10.9 GOUT: Status: ACTIVE | Noted: 2022-04-30

## 2022-04-30 PROBLEM — M16.9 OSTEOARTHRITIS OF HIP: Status: ACTIVE | Noted: 2022-04-30

## 2022-04-30 RX ORDER — AMLODIPINE BESYLATE 5 MG/1
1 TABLET ORAL DAILY
COMMUNITY
Start: 2021-07-28 | End: 2022-08-03

## 2022-04-30 RX ORDER — LISINOPRIL 20 MG/1
1 TABLET ORAL DAILY
COMMUNITY
Start: 2021-07-28 | End: 2022-08-03

## 2022-04-30 RX ORDER — METOPROLOL SUCCINATE 50 MG/1
50 TABLET, EXTENDED RELEASE ORAL DAILY
COMMUNITY
Start: 2022-01-14 | End: 2022-08-03

## 2022-04-30 RX ORDER — ATORVASTATIN CALCIUM 20 MG/1
20 TABLET, FILM COATED ORAL DAILY
COMMUNITY
Start: 2021-07-28 | End: 2022-08-03

## 2022-04-30 RX ORDER — OXYBUTYNIN CHLORIDE 5 MG/1
5 TABLET, EXTENDED RELEASE ORAL DAILY
COMMUNITY
Start: 2021-01-19 | End: 2022-08-03

## 2022-05-04 ENCOUNTER — OFFICE VISIT (OUTPATIENT)
Dept: FAMILY MEDICINE | Facility: CLINIC | Age: 72
End: 2022-05-04
Payer: COMMERCIAL

## 2022-05-04 VITALS
HEIGHT: 64 IN | BODY MASS INDEX: 37.73 KG/M2 | HEART RATE: 79 BPM | SYSTOLIC BLOOD PRESSURE: 155 MMHG | WEIGHT: 221 LBS | TEMPERATURE: 98.7 F | DIASTOLIC BLOOD PRESSURE: 90 MMHG

## 2022-05-04 DIAGNOSIS — I10 PRIMARY HYPERTENSION: ICD-10-CM

## 2022-05-04 DIAGNOSIS — M16.11 PRIMARY OSTEOARTHRITIS OF RIGHT HIP: Primary | ICD-10-CM

## 2022-05-04 DIAGNOSIS — E66.01 MORBID OBESITY (H): ICD-10-CM

## 2022-05-04 LAB
ANION GAP SERPL CALCULATED.3IONS-SCNC: 8 MMOL/L (ref 3–14)
BASOPHILS # BLD AUTO: 0.1 10E3/UL (ref 0–0.2)
BASOPHILS NFR BLD AUTO: 1 %
BUN SERPL-MCNC: 18 MG/DL (ref 7–30)
CALCIUM SERPL-MCNC: 9.7 MG/DL (ref 8.5–10.1)
CHLORIDE BLD-SCNC: 105 MMOL/L (ref 94–109)
CO2 SERPL-SCNC: 28 MMOL/L (ref 20–32)
CREAT SERPL-MCNC: 0.75 MG/DL (ref 0.52–1.04)
EOSINOPHIL # BLD AUTO: 0.1 10E3/UL (ref 0–0.7)
EOSINOPHIL NFR BLD AUTO: 1 %
ERYTHROCYTE [DISTWIDTH] IN BLOOD BY AUTOMATED COUNT: 12.2 % (ref 10–15)
GFR SERPL CREATININE-BSD FRML MDRD: 85 ML/MIN/1.73M2
GLUCOSE BLD-MCNC: 109 MG/DL (ref 70–99)
HCT VFR BLD AUTO: 48 % (ref 35–47)
HGB BLD-MCNC: 15.4 G/DL (ref 11.7–15.7)
IMM GRANULOCYTES # BLD: 0 10E3/UL
IMM GRANULOCYTES NFR BLD: 0 %
LYMPHOCYTES # BLD AUTO: 1.8 10E3/UL (ref 0.8–5.3)
LYMPHOCYTES NFR BLD AUTO: 24 %
MCH RBC QN AUTO: 29.8 PG (ref 26.5–33)
MCHC RBC AUTO-ENTMCNC: 32.1 G/DL (ref 31.5–36.5)
MCV RBC AUTO: 93 FL (ref 78–100)
MONOCYTES # BLD AUTO: 0.6 10E3/UL (ref 0–1.3)
MONOCYTES NFR BLD AUTO: 8 %
NEUTROPHILS # BLD AUTO: 5.1 10E3/UL (ref 1.6–8.3)
NEUTROPHILS NFR BLD AUTO: 67 %
PLATELET # BLD AUTO: 230 10E3/UL (ref 150–450)
POTASSIUM BLD-SCNC: 3.9 MMOL/L (ref 3.4–5.3)
RBC # BLD AUTO: 5.17 10E6/UL (ref 3.8–5.2)
SODIUM SERPL-SCNC: 141 MMOL/L (ref 133–144)
WBC # BLD AUTO: 7.7 10E3/UL (ref 4–11)

## 2022-05-04 PROCEDURE — 80048 BASIC METABOLIC PNL TOTAL CA: CPT | Performed by: FAMILY MEDICINE

## 2022-05-04 PROCEDURE — 93000 ELECTROCARDIOGRAM COMPLETE: CPT | Performed by: FAMILY MEDICINE

## 2022-05-04 PROCEDURE — 85025 COMPLETE CBC W/AUTO DIFF WBC: CPT | Mod: QW | Performed by: FAMILY MEDICINE

## 2022-05-04 PROCEDURE — 99214 OFFICE O/P EST MOD 30 MIN: CPT | Performed by: FAMILY MEDICINE

## 2022-05-04 PROCEDURE — 36415 COLL VENOUS BLD VENIPUNCTURE: CPT | Performed by: FAMILY MEDICINE

## 2022-05-04 RX ORDER — INDOMETHACIN 25 MG/1
25 CAPSULE ORAL 3 TIMES DAILY PRN
COMMUNITY
Start: 2020-10-14

## 2022-05-04 NOTE — PROGRESS NOTES
[address] [address]  Phone: [phone]  Fax: [phone]  Primary Provider: Jorge Elizabeth  Pre-op Performing Provider: JORGE ELIZABETH      PREOPERATIVE EVALUATION:  Today's date: 5/4/2022    Walter Au is a 71 year old female who presents for a preoperative evaluation.    Surgical Information:  Surgery/Procedure: R hip replacement  Surgery Location: [facility]  Surgeon: Dr. Horn  Surgery Date: 5/23/22  Time of Surgery: TBD  Where patient plans to recover: At a TCU (Transitional Care Unit)  Fax number for surgical facility: [phone]    Type of Anesthesia Anticipated: General and Spinal    Assessment & Plan   (M16.11) Primary osteoarthritis of right hip  (primary encounter diagnosis)  Comment: Patient is cleared for surgery with general or spinal anesthesia  Plan:     (I10) Primary hypertension  Comment: Pressures are slightly elevated today they have been 120s over 70s at home we will make no changes  Plan: CBC with Platelets & Differential, Basic         metabolic panel, EKG 12-lead complete w/read -         Clinics, REVIEW OF HEALTH MAINTENANCE PROTOCOL         ORDERS            (E66.01) Morbid obesity (H)  Comment:   Plan:         The proposed surgical procedure is considered INTERMEDIATE risk.            RECOMMENDATION:  APPROVAL GIVEN to proceed with proposed procedure pending review of diagnostic evaluation.                Subjective     HPI related to upcoming procedure: Right hip replacement    Preop Questions 5/4/2022   1. Have you ever had a heart attack or stroke? No   2. Have you ever had surgery on your heart or blood vessels, such as a stent placement, a coronary artery bypass, or surgery on an artery in your head, neck, heart, or legs? No   3. Do you have chest pain with activity? No   4. Do you have a history of  heart failure? No   5. Do you currently have a cold, bronchitis or symptoms of other  infection? No   6. Do you have a cough, shortness of breath, or wheezing? No   7. Do you or anyone in your family have previous history of blood clots? YES -    8. Do you or does anyone in your family have a serious bleeding problem such as prolonged bleeding following surgeries or cuts? YES -    9. Have you ever had problems with anemia or been told to take iron pills? No   10. Have you had any abnormal blood loss such as black, tarry or bloody stools, or abnormal vaginal bleeding? No   11. Have you ever had a blood transfusion? No   12. Are you willing to have a blood transfusion if it is medically needed before, during, or after your surgery? Yes   13. Have you or any of your relatives ever had problems with anesthesia? YES -    14. Do you have sleep apnea, excessive snoring or daytime drowsiness? No   15. Do you have any artifical heart valves or other implanted medical devices like a pacemaker, defibrillator, or continuous glucose monitor? No   16. Do you have artificial joints? No   17. Are you allergic to latex? No       87789}      Review of Systems  CONSTITUTIONAL: NEGATIVE for fever, chills, change in weight  INTEGUMENTARY/SKIN: NEGATIVE for worrisome rashes, moles or lesions  EYES: NEGATIVE for vision changes or irritation  ENT/MOUTH: NEGATIVE for ear, mouth and throat problems  RESP: NEGATIVE for significant cough or SOB  CV: NEGATIVE for chest pain, palpitations or peripheral edema  GI: NEGATIVE for nausea, abdominal pain, heartburn, or change in bowel habits  : NEGATIVE for frequency, dysuria, or hematuria  MUSCULOSKELETAL: NEGATIVE for significant arthralgias or myalgia  NEURO: NEGATIVE for weakness, dizziness or paresthesias  ENDOCRINE: NEGATIVE for temperature intolerance, skin/hair changes  HEME: NEGATIVE for bleeding problems  PSYCHIATRIC: NEGATIVE for changes in mood or affect    Patient Active Problem List    Diagnosis Date Noted     Gout 04/30/2022     Priority: Medium      "Hypercholesterolemia 04/30/2022     Priority: Medium     Nocturia 04/30/2022     Priority: Medium     Obesity 04/30/2022     Priority: Medium     Osteoarthritis of knee 04/30/2022     Priority: Medium     Primary hypertension 04/30/2022     Priority: Medium     Seasonal allergic rhinitis 04/30/2022     Priority: Medium     Osteoarthritis of hip 04/30/2022     Priority: Medium      No past medical history on file.  No past surgical history on file.  Current Outpatient Medications   Medication Sig Dispense Refill     amLODIPine (NORVASC) 5 MG tablet Take 1 tablet by mouth daily       atorvastatin (LIPITOR) 20 MG tablet Take 20 mg by mouth daily       calcium carbonate (OS-LOLITA) 1500 (600 Ca) MG tablet Take 600 mg by mouth daily       Cholecalciferol (VITAMIN D3 PO) Take by mouth daily       indomethacin (INDOCIN) 25 MG capsule Take 25 mg by mouth 3 times daily as needed       lisinopril (ZESTRIL) 20 MG tablet Take 1 tablet by mouth daily       metoprolol succinate ER (TOPROL-XL) 50 MG 24 hr tablet Take 50 mg by mouth daily       Omega-3 Fatty Acids (FISH OIL PO) Take by mouth daily       oxybutynin ER (DITROPAN-XL) 5 MG 24 hr tablet Take 5 mg by mouth daily         Allergies   Allergen Reactions     Amoxicillin      Other reaction(s): hand swelling, rash        Social History     Tobacco Use     Smoking status: Never Smoker     Smokeless tobacco: Never Used   Substance Use Topics     Alcohol use: Yes     Comment: rarely       History   Drug Use Not on file         Objective     BP (!) 155/90 (BP Location: Right arm, Patient Position: Sitting, Cuff Size: Adult Large)   Pulse 79   Temp 98.7  F (37.1  C) (Temporal)   Ht 1.626 m (5' 4\")   Wt 100.2 kg (221 lb)   BMI 37.93 kg/m      Physical Exam    GENERAL APPEARANCE: healthy, alert and no distress     EYES: EOMI, PERRL     HENT: ear canals and TM's normal and nose and mouth without ulcers or lesions     NECK: no adenopathy, no asymmetry, masses, or scars and thyroid " normal to palpation     RESP: lungs clear to auscultation - no rales, rhonchi or wheezes     CV: regular rates and rhythm, normal S1 S2, no S3 or S4 and no murmur, click or rub     ABDOMEN:  soft, nontender, no HSM or masses and bowel sounds normal     MS: extremities normal- no gross deformities noted, no evidence of inflammation in joints, FROM in all extremities.     SKIN: no suspicious lesions or rashes     NEURO: Normal strength and tone, sensory exam grossly normal, mentation intact and speech normal     PSYCH: mentation appears normal. and affect normal/bright     LYMPHATICS: No cervical adenopathy    No results for input(s): HGB, PLT, INR, NA, POTASSIUM, CR, A1C in the last 05359 hours.     Diagnostics:  Labs pending at this time.  Results will be reviewed when available.   EKG: appears normal, NSR, normal axis, normal intervals, no acute ST/T changes c/w ischemia, no LVH by voltage criteria, unchanged from previous tracings    Revised Cardiac Risk Index (RCRI):  The patient has the following serious cardiovascular risks for perioperative complications:   - No serious cardiac risks = 0 points     RCRI Interpretation: 0 points: Class I (very low risk - 0.4% complication rate)           Signed Electronically by: Jorge Tim MD  Copy of this evaluation report is provided to requesting physician.

## 2022-05-04 NOTE — PROGRESS NOTES
[address] [address]  Phone: [phone]  Fax: [phone]  Primary Provider: Jorge Tim  {FV AMB Performing Provider (Optional):087687}    {Provider  Link to PREOP SmartSet  Use this to apply standard patient instructions to AVS; includes medication directions, common orders, guidelines for anemia, warfarin, additional testing   :881892}  PREOPERATIVE EVALUATION:  Today's date: 5/4/2022    Walter Au is a 71 year old female who presents for a preoperative evaluation.    Surgical Information:  Surgery/Procedure: R hip replacement  Surgery Location: [facility]  Surgeon: Dr. Horn  Surgery Date: 5/23/22  Time of Surgery: ***  Where patient plans to recover: {Preop post recovery plans :841889}  Fax number for surgical facility: {SURGERY FAX NUMBER:[id number]}    Type of Anesthesia Anticipated: {ANESTHESIA:135077}    {2021 Provider Charting Preference for Preop :644992}    Subjective     HPI related to upcoming procedure: ***    {Click here to pull in Questionnaire Data after Qnr completed :039897}  Health Care Directive:  Patient does not have a Health Care Directive or Living Will: {ADVANCE_DIRECTIVE_STATUS:545485}    Preoperative Review of :  {Mnpmpreport:035422}  {Review MNPMP for all patients per [employer]CSI MNPMP Profile:849628}    {Chronic problem details (Optional) :441837}    Review of Systems  {ROS Preop Choices:617888}    Patient Active Problem List    Diagnosis Date Noted     Gout 04/30/2022     Priority: Medium     Hypercholesterolemia 04/30/2022     Priority: Medium     Nocturia 04/30/2022     Priority: Medium     Obesity 04/30/2022     Priority: Medium     Osteoarthritis of knee 04/30/2022     Priority: Medium     Primary hypertension 04/30/2022     Priority: Medium     Seasonal allergic rhinitis 04/30/2022     Priority: Medium     Osteoarthritis of hip 04/30/2022     Priority: Medium      No past medical history on  "file.  No past surgical history on file.  Current Outpatient Medications   Medication Sig Dispense Refill     amLODIPine (NORVASC) 5 MG tablet Take 1 tablet by mouth daily       atorvastatin (LIPITOR) 20 MG tablet Take 20 mg by mouth daily       lisinopril (ZESTRIL) 20 MG tablet Take 1 tablet by mouth daily       metoprolol succinate ER (TOPROL-XL) 50 MG 24 hr tablet Take 50 mg by mouth daily       oxybutynin ER (DITROPAN-XL) 5 MG 24 hr tablet Take 5 mg by mouth daily         Allergies   Allergen Reactions     Amoxicillin      Other reaction(s): hand swelling, rash        Social History     Tobacco Use     Smoking status: Not on file     Smokeless tobacco: Not on file   Substance Use Topics     Alcohol use: Not on file     {FAMILY HISTORY (Optional):639753623}  History   Drug Use Not on file         Objective     There were no vitals taken for this visit.    Physical Exam  {EXAM Preop Choices:321368}    No results for input(s): HGB, PLT, INR, NA, POTASSIUM, CR, A1C in the last 87462 hours.     Diagnostics:  {LABS:481042}   {EK}    Revised Cardiac Risk Index (RCRI):  The patient has the following serious cardiovascular risks for perioperative complications:  {PREOP REVISED CARDIAC RISK INDEX (RCRI) :985594::\" - No serious cardiac risks = 0 points\"}     RCRI Interpretation: {REVISED CARDIAC RISK INTERPRETATION :397462}         Signed Electronically by: Jorge Tim MD  Copy of this evaluation report is provided to requesting physician.    {Provider Resources  Preop [facility] Preop Guidelines  Revised Cardiac Risk Index :187388}  "

## 2022-07-16 DIAGNOSIS — R32 URINARY INCONTINENCE: Primary | ICD-10-CM

## 2022-07-19 RX ORDER — OXYBUTYNIN CHLORIDE 5 MG/1
5 TABLET, EXTENDED RELEASE ORAL DAILY
Qty: 90 TABLET | Refills: 0 | Status: SHIPPED | OUTPATIENT
Start: 2022-07-19 | End: 2022-08-03

## 2022-07-19 NOTE — TELEPHONE ENCOUNTER
"      Last office visit: 5/4/2022   Future Office Visit:   Next 5 appointments (look out 90 days)    Aug 03, 2022 10:20 AM  (Arrive by 10:00 AM)  Annual Wellness Visit with Jorge Tim MD  [facility] ([facility] ) [address] [address] [phone]                   Requested Prescriptions   Pending Prescriptions Disp Refills     oxybutynin ER (DITROPAN XL) 5 MG 24 hr tablet 90 tablet 0     Sig: Take 1 tablet (5 mg) by mouth daily       Muscarinic Antagonists (Urinary Incontinence Agents) Passed - 7/16/2022  9:34 AM        Passed - Recent (12 mo) or future (30 days) visit within the authorizing provider's specialty     Patient has had an office visit with the authorizing provider or a provider within the authorizing providers department within the previous 12 mos or has a future within next 30 days. See \"Patient Info\" tab in inbasket, or \"Choose Columns\" in Meds & Orders section of the refill encounter.              Passed - Medication is Oxybutynin and patient is 5 years of age or older        Passed - Patient does not have a diagnosis of glaucoma on the problem list     If glaucoma diagnosis is new, refer refill to physician.          Passed - Medication is active on med list        Passed - Patient is 18 years of age or older             HERNANDO SMITH RN 07/19/22 11:20 AM    "

## 2022-08-02 RX ORDER — HYDROXYZINE PAMOATE 25 MG/1
CAPSULE ORAL
COMMUNITY
Start: 2022-05-24 | End: 2022-08-03

## 2022-08-03 ENCOUNTER — OFFICE VISIT (OUTPATIENT)
Dept: FAMILY MEDICINE | Facility: CLINIC | Age: 72
End: 2022-08-03
Payer: COMMERCIAL

## 2022-08-03 VITALS
HEIGHT: 64 IN | TEMPERATURE: 98.7 F | DIASTOLIC BLOOD PRESSURE: 80 MMHG | WEIGHT: 219 LBS | SYSTOLIC BLOOD PRESSURE: 134 MMHG | HEART RATE: 68 BPM | BODY MASS INDEX: 37.39 KG/M2

## 2022-08-03 DIAGNOSIS — Z00.00 ENCOUNTER FOR MEDICARE ANNUAL WELLNESS EXAM: Primary | ICD-10-CM

## 2022-08-03 DIAGNOSIS — J30.1 SEASONAL ALLERGIC RHINITIS DUE TO POLLEN: ICD-10-CM

## 2022-08-03 DIAGNOSIS — M17.9 OSTEOARTHRITIS OF KNEE, UNSPECIFIED LATERALITY, UNSPECIFIED OSTEOARTHRITIS TYPE: ICD-10-CM

## 2022-08-03 DIAGNOSIS — Z12.11 COLON CANCER SCREENING: ICD-10-CM

## 2022-08-03 DIAGNOSIS — Z96.649 STATUS POST HIP REPLACEMENT, UNSPECIFIED LATERALITY: ICD-10-CM

## 2022-08-03 DIAGNOSIS — I10 PRIMARY HYPERTENSION: ICD-10-CM

## 2022-08-03 DIAGNOSIS — R32 URINARY INCONTINENCE IN FEMALE: ICD-10-CM

## 2022-08-03 DIAGNOSIS — E78.00 HYPERCHOLESTEROLEMIA: ICD-10-CM

## 2022-08-03 LAB
CHOLEST SERPL-MCNC: 180 MG/DL
HDLC SERPL-MCNC: 49 MG/DL
LDLC SERPL CALC-MCNC: 97 MG/DL
NONHDLC SERPL-MCNC: 131 MG/DL
TRIGL SERPL-MCNC: 170 MG/DL

## 2022-08-03 PROCEDURE — 99214 OFFICE O/P EST MOD 30 MIN: CPT | Mod: 25 | Performed by: FAMILY MEDICINE

## 2022-08-03 PROCEDURE — G0439 PPPS, SUBSEQ VISIT: HCPCS | Performed by: FAMILY MEDICINE

## 2022-08-03 PROCEDURE — 80061 LIPID PANEL: CPT | Performed by: FAMILY MEDICINE

## 2022-08-03 PROCEDURE — 36415 COLL VENOUS BLD VENIPUNCTURE: CPT | Performed by: FAMILY MEDICINE

## 2022-08-03 RX ORDER — METOPROLOL SUCCINATE 50 MG/1
50 TABLET, EXTENDED RELEASE ORAL DAILY
Qty: 90 TABLET | Refills: 1 | Status: SHIPPED | OUTPATIENT
Start: 2022-08-03 | End: 2023-01-31

## 2022-08-03 RX ORDER — CLINDAMYCIN HCL 300 MG
CAPSULE ORAL
Qty: 4 CAPSULE | Refills: 1 | Status: SHIPPED | OUTPATIENT
Start: 2022-08-03 | End: 2023-08-03

## 2022-08-03 RX ORDER — ACETAMINOPHEN 500 MG
1000 TABLET ORAL EVERY 6 HOURS PRN
COMMUNITY
Start: 2022-05-24

## 2022-08-03 RX ORDER — AMLODIPINE BESYLATE 5 MG/1
5 TABLET ORAL DAILY
Qty: 90 TABLET | Refills: 1 | Status: SHIPPED | OUTPATIENT
Start: 2022-08-03 | End: 2023-02-16

## 2022-08-03 RX ORDER — ATORVASTATIN CALCIUM 20 MG/1
20 TABLET, FILM COATED ORAL DAILY
Qty: 90 TABLET | Refills: 1 | Status: SHIPPED | OUTPATIENT
Start: 2022-08-03 | End: 2023-01-31

## 2022-08-03 RX ORDER — OXYBUTYNIN CHLORIDE 5 MG/1
5 TABLET, EXTENDED RELEASE ORAL DAILY
Qty: 90 TABLET | Refills: 1 | Status: SHIPPED | OUTPATIENT
Start: 2022-08-03 | End: 2023-02-16

## 2022-08-03 RX ORDER — LISINOPRIL 20 MG/1
20 TABLET ORAL DAILY
Qty: 90 TABLET | Refills: 1 | Status: SHIPPED | OUTPATIENT
Start: 2022-08-03 | End: 2023-02-16

## 2022-08-03 ASSESSMENT — ENCOUNTER SYMPTOMS
ARTHRALGIAS: 1
ABDOMINAL PAIN: 0
EYE PAIN: 0
CONSTIPATION: 0
NERVOUS/ANXIOUS: 0
HEMATURIA: 0
DIARRHEA: 0
PALPITATIONS: 0
HEADACHES: 0
BREAST MASS: 0
CHILLS: 0
HEARTBURN: 0
MYALGIAS: 0
SORE THROAT: 0
WEAKNESS: 0
DYSURIA: 0
FEVER: 0
JOINT SWELLING: 0
PARESTHESIAS: 0
NAUSEA: 0
HEMATOCHEZIA: 0
DIZZINESS: 0
FREQUENCY: 1
COUGH: 0

## 2022-08-03 ASSESSMENT — ACTIVITIES OF DAILY LIVING (ADL): CURRENT_FUNCTION: NO ASSISTANCE NEEDED

## 2022-08-03 NOTE — LETTER
August 4, 2022      Waletr Au  [address] [address]        Dear ,    We are writing to inform you of your test results.    Your test results fall within the expected range(s) or remain unchanged from previous results.  Please continue with current treatment plan.    Resulted Orders   Lipid panel reflex to direct LDL Fasting   Result Value Ref Range    Cholesterol 180 <200 mg/dL    Triglycerides 170 (H) <150 mg/dL    Direct Measure HDL 49 (L) >=50 mg/dL    LDL Cholesterol Calculated 97 <=100 mg/dL    Non HDL Cholesterol 131 (H) <130 mg/dL    Narrative    Cholesterol  Desirable:  <200 mg/dL    Triglycerides  Normal:  Less than 150 mg/dL  Borderline High:  150-199 mg/dL  High:  200-499 mg/dL  Very High:  Greater than or equal to 500 mg/dL    Direct Measure HDL  Female:  Greater than or equal to 50 mg/dL   Male:  Greater than or equal to 40 mg/dL    LDL Cholesterol  Desirable:  <100mg/dL  Above Desirable:  100-129 mg/dL   Borderline High:  130-159 mg/dL   High:  160-189 mg/dL   Very High:  >= 190 mg/dL    Non HDL Cholesterol  Desirable:  130 mg/dL  Above Desirable:  130-159 mg/dL  Borderline High:  160-189 mg/dL  High:  190-219 mg/dL  Very High:  Greater than or equal to 220 mg/dL       If you have any questions or concerns, please call the clinic at the number listed above.       Sincerely,      Jorge Tim MD

## 2022-08-03 NOTE — LETTER
August 21, 2022      Walter Au  [address] [address]        Dear ,    We are writing to inform you of your test results.    Your test results fall within the expected range(s) or remain unchanged from previous results.  Please continue with current treatment plan.    Resulted Orders   Lipid panel reflex to direct LDL Fasting   Result Value Ref Range    Cholesterol 180 <200 mg/dL    Triglycerides 170 (H) <150 mg/dL    Direct Measure HDL 49 (L) >=50 mg/dL    LDL Cholesterol Calculated 97 <=100 mg/dL    Non HDL Cholesterol 131 (H) <130 mg/dL    Narrative    Cholesterol  Desirable:  <200 mg/dL    Triglycerides  Normal:  Less than 150 mg/dL  Borderline High:  150-199 mg/dL  High:  200-499 mg/dL  Very High:  Greater than or equal to 500 mg/dL    Direct Measure HDL  Female:  Greater than or equal to 50 mg/dL   Male:  Greater than or equal to 40 mg/dL    LDL Cholesterol  Desirable:  <100mg/dL  Above Desirable:  100-129 mg/dL   Borderline High:  130-159 mg/dL   High:  160-189 mg/dL   Very High:  >= 190 mg/dL    Non HDL Cholesterol  Desirable:  130 mg/dL  Above Desirable:  130-159 mg/dL  Borderline High:  160-189 mg/dL  High:  190-219 mg/dL  Very High:  Greater than or equal to 220 mg/dL   COLOGUARD(EXACT SCIENCES)   Result Value Ref Range    COLOGUARD-ABSTRACT Negative Negative      Comment:        NEGATIVE TEST RESULT. A negative Cologuard result indicates a low likelihood that a colorectal cancer (CRC) or advanced adenoma (adenomatous polyps with more advanced pre-malignant features)  is present. The chance that a person with a negative Cologuard test has a colorectal cancer is less than 1 in 1500 (negative predictive value >99.9%) or has an  advanced adenoma is less than  5.3% (negative predictive value 94.7%). These data are based on a prospective cross-sectional study of 10,000 individuals at average risk for colorectal cancer who were screened with both Cologuard and colonoscopy.  (Shima Chavarria al, N Engl J Med 2014;370(14):2657-1892) The normal value (reference range) for this assay is negative.    COLOGUARD RE-SCREENING RECOMMENDATION: Periodic colorectal cancer screening is an important part of preventive healthcare for asymptomatic individuals at average risk for colorectal cancer.  Following a negative Cologuard result, the American Cancer Society and U.S.  Multi-Society Task Force screening guidelines recommend a Cologuard re-screening interval of 3 years.   References: American Cancer Society Guideline for Colorectal Cancer Screening: https://www.cancer.org/cancer/colon-rectal-cancer/nnomdeusm-bohcgrnvw-cjiqlof/acs-recommendations.html.; Kiet DK, Chuck LINCOLN, Marcia HIDALGOK, Colorectal Cancer Screening: Recommendations for Physicians and Patients from the U.S. Multi-Society Task Force on Colorectal Cancer Screening , Am J Gastroenterology 2017; 112:9429-0980.    TEST DESCRIPTION: Composite algorithmic analysis of stool DNA-biomarkers with hemoglobin immunoassay.   Quantitative values of individual biomarkers are not reportable and are not associated with individual biomarker result reference ranges. Cologuard is intended for colorectal cancer screening of adults of either sex, 45 years or older, who are at average-risk for colorectal cancer (CRC). Cologuard has been approved for use by the U.S. FDA. The performance of Cologuard was  established in a cross sectional study of average-risk adults aged 50-84. Cologuard performance in patients ages 45 to 49 years was estimated by sub-group analysis of near-age groups. Colonoscopies performed for a positive result may find as the most clinically significant lesion: colorectal cancer [4.0%], advanced adenoma (including sessile serrated polyps greater than or equal to 1cm diameter) [20%] or non- advanced adenoma [31%]; or no colorectal neoplasia [45%]. These estimates are derived from a prospective cross-sectional screening study of 10,000  individuals at average risk for colorectal cancer who were screened with both Cologuard and colonoscopy. (Shima GALO et al, N Engl J Med 2014;370(14):7630-3500.) Cologuard may produce a false negative or false positive result (no colorectal cancer or precancerous polyp present at colonoscopy follow up). A negative Cologuard test result does not guarantee the absence of CRC or advanced adenoma (pre-cancer). The current Cologuard  screening interval is every 3 years. (American Cancer Society and U.S. Multi-Society Task Force). Cologuard performance data in a 10,000 patient pivotal study using colonoscopy as the reference method can be accessed at the following location: [email or web address]. Additional description of the Cologuard test process, warnings and precautions can be found at [email or web address].         If you have any questions or concerns, please call the clinic at the number listed above.       Sincerely,      Jorge Tim MD

## 2022-08-03 NOTE — PATIENT INSTRUCTIONS
Patient Education   Personalized Prevention Plan  You are due for the preventive services outlined below.  Your care team is available to assist you in scheduling these services.  If you have already completed any of these items, please share that information with your care team to update in your medical record.  Health Maintenance Due   Topic Date Due     Hepatitis C Screening  Never done     Zoster (Shingles) Vaccine (1 of 2) Never done     COVID-19 Vaccine (4 - Booster for Moderna series) 04/02/2022     Colorectal Cancer Screening  07/25/2022     Cholesterol Lab  07/28/2022       Exercise for a Healthier Heart  You may wonder how you can improve the health of your heart. If you re thinking about exercise, you re on the right track. You don t need to become an athlete. But you do need a certain amount of brisk exercise to help strengthen your heart. If you have been diagnosed with a heart condition, your healthcare provider may advise exercise to help stabilize your condition. To help make exercise a habit, choose safe, fun activities.      Exercise with a friend. When activity is fun, you're more likely to stick with it.   Before you start  Check with your healthcare provider before starting an exercise program. This is especially important if you have not been active for a while. It's also important if you have a long-term (chronic) health problem such as heart disease, diabetes, or obesity. Or if you are at high risk for having these problems.   Why exercise?  Exercising regularly offers many healthy rewards. It can help you do all of the following:     Improve your blood cholesterol level to help prevent further heart trouble    Lower your blood pressure to help prevent a stroke or heart attack    Control diabetes, or reduce your risk of getting this disease    Improve your heart and lung function    Reach and stay at a healthy weight    Make your muscles stronger so you can stay active    Prevent falls and  fractures by slowing the loss of bone mass (osteoporosis)    Manage stress better    Reduce your blood pressure    Improve your sense of self and your body image  Exercise tips      Ease into your routine. Set small goals. Then build on them. If you are not sure what your activity level should be, talk with your healthcare provider first before starting an exercise routine.    Exercise on most days. Aim for a total of 150 minutes (2 hours and 30 minutes) or more of moderate-intensity aerobic activity each week. Or 75 minutes (1 hour and 15 minutes) or more of vigorous-intensity aerobic activity each week. Or try for a combination of both. Moderate activity means that you breathe heavier and your heart rate increases but you can still talk. Think about doing 40 minutes of moderate exercise, 3 to 4 times a week. For best results, activity should last for about 40 minutes to lower blood pressure and cholesterol. It's OK to work up to the 40-minute period over time. Examples of moderate-intensity activity are walking 1 mile in 15 minutes. Or doing 30 to 45 minutes of yard work.    Step up your daily activity level.  Along with your exercise program, try being more active the whole day. Walk instead of drive. Or park further away so that you take more steps each day. Do more household tasks or yard work. You may not be able to meet the advised mount of physical activity. But doing some moderate- or vigorous-intensity aerobic activity can help reduce your risk for heart disease. Your healthcare provider can help you figure out what is best for you.    Choose 1 or more activities you enjoy.  Walking is one of the easiest things you can do. You can also try swimming, riding a bike, dancing, or taking an exercise class.    When to call your healthcare provider  Call your healthcare provider if you have any of these:     Chest pain or feel dizzy or lightheaded    Burning, tightness, pressure, or heaviness in your chest, neck,  shoulders, back, or arms    Abnormal shortness of breath    More joint or muscle pain    A very fast or irregular heartbeat (palpitations)  [employer] last reviewed this educational content on 7/1/2019 2000-2021 [employer]. All rights reserved. This information is not intended as a substitute for professional medical care. Always follow your healthcare professional's instructions.          Urinary Incontinence, Female (Adult)   Urinary incontinence means loss of bladder control. This problem affects many women, especially as they get older. If you have incontinence, you may be embarrassed to ask for help. But know that this problem can be treated.   Types of Incontinence  There are different types of incontinence. Two of the main types are described here. You can have more than one type.     Stress incontinence. With this type, urine leaks when pressure (stress) is put on the bladder. This may happen when you cough, sneeze, or laugh. Stress incontinence most often occurs because the pelvic floor muscles that support the bladder and urethra are weak. This can happen after pregnancy and vaginal childbirth or a hysterectomy. It can also be due to excess body weight or hormone changes.    Urge incontinence (also called overactive bladder). With this type, a sudden urge to urinate is felt often. This may happen even though there may not be much urine in the bladder. The need to urinate often during the night is common. Urge incontinence most often occurs because of bladder spasms. This may be due to bladder irritation or infection. Damage to bladder nerves or pelvic muscles, constipation, and certain medicines can also lead to urge incontinence.  Treatment depends on the cause. Further evaluation is needed to find the type you have. This will likely include an exam and certain tests. Based on the results, you and your healthcare provider can then plan treatment. Until a diagnosis is made, the home care  tips below can help ease symptoms.   Home care    Do pelvic floor muscle exercises, if they are prescribed. The pelvic floor muscles help support the bladder and urethra. Many women find that their symptoms improve when doing special exercises that strengthen these muscles. To do the exercises, contract the muscles you would use to stop your stream of urine. But do this when you re not urinating. Hold for 10 seconds, then relax. Repeat 10 to 20 times in a row, at least 3 times a day. Your healthcare provider may give you other instructions for how to do the exercises and how often.    Keep a bladder diary. This helps track how often and how much you urinate over a set period of time. Bring this diary with you to your next visit with the provider. The information can help your provider learn more about your bladder problem.    Lose weight, if advised to by your provider. Extra weight puts pressure on the bladder. Your provider can help you create a weight-loss plan that s right for you. This may include exercising more and making certain diet changes.    Don't have foods and drinks that may irritate the bladder. These can include alcohol and caffeinated drinks.    Quit smoking. Smoking and other tobacco use can lead to a long-term (chronic) cough that strains the pelvic floor muscles. Smoking may also damage the bladder and urethra. Talk with your provider about treatments or methods you can use to quit smoking.    If drinking large amounts of fluid makes you have symptoms, you may be advised to limit your fluid intake. You may also be advised to drink most of your fluids during the day and to limit fluids at night.    If you re worried about urine leakage or accidents, you may wear absorbent pads to catch urine. Change the pads often. This helps reduce discomfort. It may also reduce the risk of skin or bladder infections.    Follow-up care  Follow up with your healthcare provider, or as directed. It may take some to  find the right treatment for your problem. But healthy lifestyle changes can be made right away. These include such things as exercising on a regular basis, eating a healthy diet, losing weight (if needed), and quitting smoking. Your treatment plan may include special therapies or medicines. Certain procedures or surgery may also be options. Talk about any questions you have with your provider.   When to seek medical advice  Call the healthcare provider right away if any of these occur:    Fever of 100.4 F (38 C) or higher, or as directed by your provider    Bladder pain or fullness    Belly swelling    Nausea or vomiting    Back pain    Weakness, dizziness, or fainting  [employer] last reviewed this educational content on 1/1/2020 2000-2021 [employer]. All rights reserved. This information is not intended as a substitute for professional medical care. Always follow your healthcare professional's instructions.

## 2022-08-03 NOTE — PROGRESS NOTES
"    She is at risk for lack of exercise and has been provided with information to increase physical activity for the benefit of her well-being.  Information on urinary incontinence and treatment options given to patient.  Answers for HPI/ROS submitted by the patient on 8/3/2022  In general, how would you rate your overall physical health?: good  Frequency of exercise:: None  Do you usually eat at least 4 servings of fruit and vegetables a day, include whole grains & fiber, and avoid regularly eating high fat or \"junk\" foods? : Yes  Taking medications regularly:: Yes  Medication side effects:: Other  Activities of Daily Living: no assistance needed  Home safety: throw rugs in the hallway  Hearing Impairment:: no hearing concerns  In the past 6 months, have you been bothered by leaking of urine?: Yes  abdominal pain: No  Blood in stool: No  Blood in urine: No  chest pain: No  chills: No  congestion: No  constipation: No  cough: No  diarrhea: No  dizziness: No  ear pain: No  eye pain: No  nervous/anxious: No  fever: No  frequency: Yes  genital sores: No  headaches: No  hearing loss: No  heartburn: No  arthralgias: Yes  joint swelling: No  peripheral edema: No  mood changes: No  myalgias: No  nausea: No  dysuria: No  palpitations: No  Skin sensation changes: No  sore throat: No  urgency: Yes  rash: No  visual disturbance: No  weakness: No  pelvic pain: No  vaginal bleeding: No  vaginal discharge: No  tenderness: No  breast mass: No  breast discharge: No  In general, how would you rate your overall mental or emotional health?: good  Additional concerns today:: No      "

## 2022-08-03 NOTE — PROGRESS NOTES
"  SUBJECTIVE:   Walter Au is a 72 year old female who presents for Preventive Visit.  Patient continues to live independently with her  Yao.  No health problems or concerns her joints to be doing much better since her right hip replacement.    Patient has been advised of split billing requirements and indicates understanding: Yes     Are you in the first 12 months of your Medicare coverage?  No    Healthy Habits:     In general, how would you rate your overall health?  Good    Frequency of exercise:  None    Do you usually eat at least 4 servings of fruit and vegetables a day, include whole grains    & fiber and avoid regularly eating high fat or \"junk\" foods?  Yes    Taking medications regularly:  Yes    Medication side effects:  Other    Ability to successfully perform activities of daily living:  No assistance needed    Home Safety:  Throw rugs in the hallway    Hearing Impairment:  No hearing concerns    In the past 6 months, have you been bothered by leaking of urine? Yes    In general, how would you rate your overall mental or emotional health?  Good      PHQ-2 Total Score: 0    Additional concerns today:  No    Discuss abx prophylactic prior to dental appts.     Do you feel safe in your environment? Yes    Have you ever done Advance Care Planning? (For example, a Health Directive, POLST, or a discussion with a medical provider or your loved ones about your wishes): Yes, advance care planning is on file.      Right Ear:      1000 Hz: RESPONSE- on Level:   20 db    2000 Hz: RESPONSE- on Level:   20 db    4000 Hz: RESPONSE- on Level:   20 db     Left Ear:      4000 Hz: RESPONSE- on Level:   20 db    2000 Hz: RESPONSE- on Level:   20 db    1000 Hz: RESPONSE- on Level:   20 db     Hearing Acuity: Pass    Hearing Assessment: normal     Fall risk  Fallen 2 or more times in the past year?: No  Any fall with injury in the past year?: No    Cognitive Screening   1) Repeat 3 items (Leader, Season, Table)  "   2) Clock draw: NORMAL  3) 3 item recall: Recalls 3 objects  Results: 3 items recalled: COGNITIVE IMPAIRMENT LESS LIKELY    Mini-CogTM Copyright S Atilio. Licensed by the author for use in [facility]; reprinted with permission ([email or web address]). All rights reserved.          Reviewed and updated as needed this visit by clinical staff   Tobacco  Allergies  Meds                Reviewed and updated as needed this visit by Provider                   Social History     Tobacco Use     Smoking status: Never Smoker     Smokeless tobacco: Never Used   Substance Use Topics     Alcohol use: Yes     Comment: rarely     If you drink alcohol do you typically have >3 drinks per day or >7 drinks per week? No    Alcohol Use 8/3/2022   Prescreen: >3 drinks/day or >7 drinks/week? No           Current providers sharing in care for this patient include:   Patient Care Team:  Jorge Tim MD as PCP - General (Family Medicine)  Jorge Tim MD as Assigned PCP   Dr. Collins - jackelin     The following health maintenance items are reviewed in Epic and correct as of today:  Health Maintenance Due   Topic Date Due     HEPATITIS C SCREENING  Never done     ZOSTER IMMUNIZATION (1 of 2) Never done     ADVANCE CARE PLANNING  05/21/2014     COVID-19 Vaccine (4 - Booster for Moderna series) 04/02/2022     COLORECTAL CANCER SCREENING  07/25/2022     LIPID  07/28/2022     MEDICARE ANNUAL WELLNESS VISIT  07/28/2022       Patient Active Problem List   Diagnosis     Gout     Hypercholesterolemia     Nocturia     Obesity     Osteoarthritis of knee     Primary hypertension     Seasonal allergic rhinitis     Osteoarthritis of hip     Morbid obesity (H)     Status post hip replacement, unspecified laterality     Urinary incontinence in female     Current Outpatient Medications   Medication     acetaminophen (TYLENOL) 500 MG tablet     amLODIPine (NORVASC) 5 MG tablet     atorvastatin (LIPITOR) 20 MG tablet     calcium carbonate  "(OS-LOLITA) 1500 (600 Ca) MG tablet     Cholecalciferol (VITAMIN D3 PO)     indomethacin (INDOCIN) 25 MG capsule     lisinopril (ZESTRIL) 20 MG tablet     metoprolol succinate ER (TOPROL XL) 50 MG 24 hr tablet     Omega-3 Fatty Acids (FISH OIL PO)     oxybutynin ER (DITROPAN XL) 5 MG 24 hr tablet     Garlic Oil 500 MG TABS     No current facility-administered medications for this visit.     History reviewed. No pertinent surgical history.          Review of Systems   Constitutional: Negative for chills and fever.   HENT: Negative for congestion, ear pain, hearing loss and sore throat.    Eyes: Negative for pain and visual disturbance.   Respiratory: Negative for cough.    Cardiovascular: Negative for chest pain, palpitations and peripheral edema.   Gastrointestinal: Negative for abdominal pain, constipation, diarrhea, heartburn, hematochezia and nausea.   Breasts:  Negative for tenderness, breast mass and discharge.   Genitourinary: Positive for frequency and urgency. Negative for dysuria, genital sores, hematuria, pelvic pain, vaginal bleeding and vaginal discharge.   Musculoskeletal: Positive for arthralgias. Negative for joint swelling and myalgias.   Skin: Negative for rash.   Neurological: Negative for dizziness, weakness, headaches and paresthesias.   Psychiatric/Behavioral: Negative for mood changes. The patient is not nervous/anxious.          OBJECTIVE:   /80 (BP Location: Right arm, Patient Position: Sitting, Cuff Size: Adult Large)   Pulse 68   Temp 98.7  F (37.1  C) (Tympanic)   Ht 1.626 m (5' 4\")   Wt 99.3 kg (219 lb)   BMI 37.59 kg/m   Estimated body mass index is 37.59 kg/m  as calculated from the following:    Height as of this encounter: 1.626 m (5' 4\").    Weight as of this encounter: 99.3 kg (219 lb).  Physical Exam  GENERAL: healthy, alert and no distress  NECK: no adenopathy, no asymmetry, masses, or scars and thyroid normal to palpation  RESP: lungs clear to auscultation - no rales, " "rhonchi or wheezes  CV: regular rate and rhythm, normal S1 S2, no S3 or S4, no murmur, click or rub, no peripheral edema and peripheral pulses strong  ABDOMEN: soft, nontender, no hepatosplenomegaly, no masses and bowel sounds normal  MS: no gross musculoskeletal defects noted, no edema        ASSESSMENT / PLAN:   (Z00.00) Encounter for Medicare annual wellness exam  (primary encounter diagnosis)  Comment: Healthcare maintenance and advance directives reviewed  Plan:     (E78.00) Hypercholesterolemia  Comment: Well-controlled  Plan: Lipid panel reflex to direct LDL Fasting,         atorvastatin (LIPITOR) 20 MG tablet            (R32) Urinary incontinence in female  Comment: Satisfied with results with oxybutynin  Plan: oxybutynin ER (DITROPAN XL) 5 MG 24 hr tablet            (I10) Primary hypertension  Comment: Under acceptable control with meds as below  Plan: amLODIPine (NORVASC) 5 MG tablet, lisinopril         (ZESTRIL) 20 MG tablet, metoprolol succinate ER        (TOPROL XL) 50 MG 24 hr tablet            (Z12.11) Colon cancer screening  Comment: Due for Cologuard  Plan: COLOGUARD([employer])            (Z96.649) Status post hip replacement, unspecified laterality  Comment: Pain-free  Plan:     (M17.10) Osteoarthritis of knee, unspecified laterality, unspecified osteoarthritis type  Comment: Active urinary trouble  Plan:     (J30.1) Seasonal allergic rhinitis due to pollen  Comment: Asymptomatic  Plan:             COUNSELING:  Reviewed preventive health counseling, as reflected in patient instructions    Estimated body mass index is 37.59 kg/m  as calculated from the following:    Height as of this encounter: 1.626 m (5' 4\").    Weight as of this encounter: 99.3 kg (219 lb).        She reports that she has never smoked. She has never used smokeless tobacco.      Appropriate preventive services were discussed with this patient, including applicable screening as appropriate for cardiovascular disease, diabetes, " osteopenia/osteoporosis, and glaucoma.  As appropriate for age/gender, discussed screening for colorectal cancer, prostate cancer, breast cancer, and cervical cancer. Checklist reviewing preventive services available has been given to the patient.    Reviewed patients plan of care and provided an AVS. The Basic Care Plan (routine screening as documented in Health Maintenance) for Walter meets the Care Plan requirement. This Care Plan has been established and reviewed with the Patient.    Counseling Resources:  ATP IV Guidelines  Pooled Cohorts Equation Calculator  Breast Cancer Risk Calculator  Breast Cancer: Medication to Reduce Risk  FRAX Risk Assessment  ICSI Preventive Guidelines  Dietary Guidelines for Americans, 2010  USDA's MyPlate  ASA Prophylaxis  Lung CA Screening    Jorge Tim MD  [facility]    Identified Health Risks:

## 2022-08-10 ENCOUNTER — DOCUMENTATION ONLY (OUTPATIENT)
Dept: OTHER | Facility: CLINIC | Age: 72
End: 2022-08-10

## 2022-08-21 LAB — NONINV COLON CA DNA+OCC BLD SCRN STL QL: NEGATIVE

## 2022-09-21 ENCOUNTER — IMMUNIZATION (OUTPATIENT)
Dept: FAMILY MEDICINE | Facility: CLINIC | Age: 72
End: 2022-09-21
Payer: COMMERCIAL

## 2022-09-21 PROCEDURE — 90662 IIV NO PRSV INCREASED AG IM: CPT

## 2022-09-21 PROCEDURE — G0008 ADMIN INFLUENZA VIRUS VAC: HCPCS

## 2023-01-29 DIAGNOSIS — I10 PRIMARY HYPERTENSION: ICD-10-CM

## 2023-01-29 DIAGNOSIS — E78.00 HYPERCHOLESTEROLEMIA: ICD-10-CM

## 2023-01-31 RX ORDER — ATORVASTATIN CALCIUM 20 MG/1
TABLET, FILM COATED ORAL
Qty: 90 TABLET | Refills: 0 | Status: SHIPPED | OUTPATIENT
Start: 2023-01-31 | End: 2023-02-09

## 2023-01-31 RX ORDER — METOPROLOL SUCCINATE 50 MG/1
TABLET, EXTENDED RELEASE ORAL
Qty: 90 TABLET | Refills: 0 | Status: SHIPPED | OUTPATIENT
Start: 2023-01-31 | End: 2023-02-09

## 2023-01-31 NOTE — TELEPHONE ENCOUNTER
Prescription approved per [facility] Refill Protocol.    Last Written Prescription Date: 8/3/22  Last Fill Quantity: 90,  # refills: 1   Last office visit: 8/3/2022 with prescribing provider   Future Office Visit:   Next 5 appointments (look out 90 days)    Feb 16, 2023 10:20 AM  (Arrive by 10:00 AM)  Provider Visit with Jorge Tim MD  [facility] ([facility] ) [address] [place] [id number] [phone]

## 2023-02-16 ENCOUNTER — OFFICE VISIT (OUTPATIENT)
Dept: FAMILY MEDICINE | Facility: CLINIC | Age: 73
End: 2023-02-16
Payer: COMMERCIAL

## 2023-02-16 VITALS
DIASTOLIC BLOOD PRESSURE: 70 MMHG | BODY MASS INDEX: 38.41 KG/M2 | HEIGHT: 64 IN | SYSTOLIC BLOOD PRESSURE: 135 MMHG | HEART RATE: 68 BPM | TEMPERATURE: 98.4 F | WEIGHT: 225 LBS

## 2023-02-16 DIAGNOSIS — I10 PRIMARY HYPERTENSION: ICD-10-CM

## 2023-02-16 DIAGNOSIS — E78.00 HYPERCHOLESTEROLEMIA: ICD-10-CM

## 2023-02-16 DIAGNOSIS — R32 URINARY INCONTINENCE IN FEMALE: ICD-10-CM

## 2023-02-16 DIAGNOSIS — Z23 NEED FOR VACCINATION: Primary | ICD-10-CM

## 2023-02-16 PROCEDURE — 0134A COVID-19 VACCINE BIVALENT BOOSTER 18+ (MODERNA): CPT | Performed by: FAMILY MEDICINE

## 2023-02-16 PROCEDURE — 99214 OFFICE O/P EST MOD 30 MIN: CPT | Performed by: FAMILY MEDICINE

## 2023-02-16 PROCEDURE — 91313 COVID-19 VACCINE BIVALENT BOOSTER 18+ (MODERNA): CPT | Performed by: FAMILY MEDICINE

## 2023-02-16 RX ORDER — LISINOPRIL 20 MG/1
20 TABLET ORAL DAILY
Qty: 90 TABLET | Refills: 1 | Status: SHIPPED | OUTPATIENT
Start: 2023-02-16 | End: 2023-08-03

## 2023-02-16 RX ORDER — OXYBUTYNIN CHLORIDE 5 MG/1
5 TABLET, EXTENDED RELEASE ORAL DAILY
Qty: 90 TABLET | Refills: 1 | Status: SHIPPED | OUTPATIENT
Start: 2023-02-16 | End: 2023-08-03

## 2023-02-16 RX ORDER — METOPROLOL SUCCINATE 50 MG/1
50 TABLET, EXTENDED RELEASE ORAL DAILY
Qty: 90 TABLET | Refills: 1 | Status: SHIPPED | OUTPATIENT
Start: 2023-02-16 | End: 2023-08-03

## 2023-02-16 RX ORDER — ATORVASTATIN CALCIUM 20 MG/1
20 TABLET, FILM COATED ORAL DAILY
Qty: 90 TABLET | Refills: 1 | Status: SHIPPED | OUTPATIENT
Start: 2023-02-16 | End: 2023-08-03

## 2023-02-16 RX ORDER — AMLODIPINE BESYLATE 5 MG/1
5 TABLET ORAL DAILY
Qty: 90 TABLET | Refills: 1 | Status: SHIPPED | OUTPATIENT
Start: 2023-02-16 | End: 2023-08-03

## 2023-02-16 NOTE — PROGRESS NOTES
"  Assessment & Plan     Primary hypertension  Under excellent control with meds as below we will make no changes  - amLODIPine (NORVASC) 5 MG tablet; Take 1 tablet (5 mg) by mouth daily  - lisinopril (ZESTRIL) 20 MG tablet; Take 1 tablet (20 mg) by mouth daily  - metoprolol succinate ER (TOPROL XL) 50 MG 24 hr tablet; Take 1 tablet (50 mg) by mouth daily    Hypercholesterolemia  Controlled with atorvastatin  - atorvastatin (LIPITOR) 20 MG tablet; Take 1 tablet (20 mg) by mouth daily    Urinary incontinence in female  Very pleased with her medication  - oxybutynin ER (DITROPAN XL) 5 MG 24 hr tablet; Take 1 tablet (5 mg) by mouth daily    Need for vaccination    - COVID-19 VACCINE BIVALENT BOOSTER 18+ (MODERNA)             BMI:   Estimated body mass index is 38.62 kg/m  as calculated from the following:    Height as of this encounter: 1.626 m (5' 4\").    Weight as of this encounter: 102.1 kg (225 lb).           Return in about 6 months (around 8/16/2023) for Follow up.    Jorge Tim MD  [facility] [place]    Jason Watson is a 72 year old, presenting for the following health issues: Overall doing well.  Continues to live independently with her  Yao.  No falls.  Hypertension      History of Present Illness       Hypertension: She presents for follow up of hypertension.  She does check blood pressure  regularly outside of the clinic. Outside blood pressures have been over 140/90. She follows a low salt diet.         Patient Active Problem List   Diagnosis     Gout     Hypercholesterolemia     Nocturia     Obesity     Osteoarthritis of knee     Primary hypertension     Seasonal allergic rhinitis     Osteoarthritis of hip     Morbid obesity (H)     Status post hip replacement, unspecified laterality     Urinary incontinence in female     Current Outpatient Medications   Medication     acetaminophen (TYLENOL) 500 MG tablet     amLODIPine (NORVASC) 5 MG tablet     atorvastatin " "(LIPITOR) 20 MG tablet     calcium carbonate (OS-LOLITA) 1500 (600 Ca) MG tablet     Cholecalciferol (VITAMIN D3 PO)     clindamycin (CLEOCIN) 300 MG capsule     Garlic Oil 500 MG TABS     indomethacin (INDOCIN) 25 MG capsule     lisinopril (ZESTRIL) 20 MG tablet     metoprolol succinate ER (TOPROL XL) 50 MG 24 hr tablet     Omega-3 Fatty Acids (FISH OIL PO)     oxybutynin ER (DITROPAN XL) 5 MG 24 hr tablet     No current facility-administered medications for this visit.           Review of Systems   Constitutional, HEENT, cardiovascular, pulmonary, gi and gu systems are negative, except as otherwise noted.      Objective    /70   Pulse 68   Temp 98.4  F (36.9  C) (Temporal)   Ht 1.626 m (5' 4\")   Wt 102.1 kg (225 lb)   BMI 38.62 kg/m    Body mass index is 38.62 kg/m .  Physical Exam   GENERAL: healthy, alert and no distress  NECK: no adenopathy, no asymmetry, masses, or scars and thyroid normal to palpation  RESP: lungs clear to auscultation - no rales, rhonchi or wheezes  CV: regular rate and rhythm, normal S1 S2, no S3 or S4, no murmur, click or rub, no peripheral edema and peripheral pulses strong  ABDOMEN: soft, nontender, no hepatosplenomegaly, no masses and bowel sounds normal  MS: no gross musculoskeletal defects noted, no edema    Office Visit on 08/03/2022   Component Date Value Ref Range Status     Cholesterol 08/03/2022 180  <200 mg/dL Final     Triglycerides 08/03/2022 170 (H)  <150 mg/dL Final     Direct Measure HDL 08/03/2022 49 (L)  >=50 mg/dL Final     LDL Cholesterol Calculated 08/03/2022 97  <=100 mg/dL Final     Non HDL Cholesterol 08/03/2022 131 (H)  <130 mg/dL Final     COLOGUARD-ABSTRACT 08/14/2022 Negative  Negative Final    Comment:   NEGATIVE TEST RESULT. A negative Cologuard result indicates a low likelihood that a colorectal cancer (CRC) or advanced adenoma (adenomatous polyps with more advanced pre-malignant features)  is present. The chance that a person with a negative " Cologuard test has a colorectal cancer is less than 1 in 1500 (negative predictive value >99.9%) or has an  advanced adenoma is less than  5.3% (negative predictive value 94.7%). These data are based on a prospective cross-sectional study of 10,000 individuals at average risk for colorectal cancer who were screened with both Cologuard and colonoscopy. (Shima GALO et al, N Engl J Med 2014;370(14):9067-7438) The normal value (reference range) for this assay is negative.    COLOGUARD RE-SCREENING RECOMMENDATION: Periodic colorectal cancer screening is an important part of preventive healthcare for asymptomatic individuals at average risk for colorectal cancer.  Following a negative Cologuard result, the American Cancer Society and U.S.                            Multi-Society Task Force screening guidelines recommend a Cologuard re-screening interval of 3 years.   References: American Cancer Society Guideline for Colorectal Cancer Screening: https://www.cancer.org/cancer/colon-rectal-cancer/xnrwmilwn-ldowxtpjc-phxqkta/acs-recommendations.html.; Kiet DK, Chuck LINCOLN, Marcia HIDALGOK, Colorectal Cancer Screening: Recommendations for Physicians and Patients from the U.S. Multi-Society Task Force on Colorectal Cancer Screening , Am J Gastroenterology 2017; 112:6850-2394.    TEST DESCRIPTION: Composite algorithmic analysis of stool DNA-biomarkers with hemoglobin immunoassay.   Quantitative values of individual biomarkers are not reportable and are not associated with individual biomarker result reference ranges. Cologuard is intended for colorectal cancer screening of adults of either sex, 45 years or older, who are at average-risk for colorectal cancer (CRC). Cologuard has been approved for use by the U.S. FDA. The performance of Cologuard was                            established in a cross sectional study of average-risk adults aged 50-84. Cologuard performance in patients ages 45 to 49 years was estimated by sub-group  analysis of near-age groups. Colonoscopies performed for a positive result may find as the most clinically significant lesion: colorectal cancer [4.0%], advanced adenoma (including sessile serrated polyps greater than or equal to 1cm diameter) [20%] or non- advanced adenoma [31%]; or no colorectal neoplasia [45%]. These estimates are derived from a prospective cross-sectional screening study of 10,000 individuals at average risk for colorectal cancer who were screened with both Cologuard and colonoscopy. (Shima Chavarria al, N Engl J Med 2014;370(14):5310-2211.) Cologuard may produce a false negative or false positive result (no colorectal cancer or precancerous polyp present at colonoscopy follow up). A negative Cologuard test result does not guarantee the absence of CRC or advanced adenoma (pre-cancer). The current Cologuard                            screening interval is every 3 years. (American Cancer Society and U.S. Multi-Society Task Force). Cologuard performance data in a 10,000 patient pivotal study using colonoscopy as the reference method can be accessed at the following location: www.[email or web address].[email or web address].mobility/results. Additional description of the Cologuard test process, warnings and precautions can be found at [email or web address].

## 2023-08-09 ENCOUNTER — OFFICE VISIT (OUTPATIENT)
Dept: FAMILY MEDICINE | Facility: CLINIC | Age: 73
End: 2023-08-09
Payer: COMMERCIAL

## 2023-08-09 VITALS
BODY MASS INDEX: 38.76 KG/M2 | TEMPERATURE: 98 F | RESPIRATION RATE: 20 BRPM | HEIGHT: 64 IN | DIASTOLIC BLOOD PRESSURE: 82 MMHG | SYSTOLIC BLOOD PRESSURE: 134 MMHG | OXYGEN SATURATION: 98 % | WEIGHT: 227 LBS | HEART RATE: 68 BPM

## 2023-08-09 DIAGNOSIS — M10.9 GOUT, UNSPECIFIED CAUSE, UNSPECIFIED CHRONICITY, UNSPECIFIED SITE: ICD-10-CM

## 2023-08-09 DIAGNOSIS — Z00.00 ENCOUNTER FOR MEDICARE ANNUAL WELLNESS EXAM: Primary | ICD-10-CM

## 2023-08-09 DIAGNOSIS — R32 URINARY INCONTINENCE IN FEMALE: ICD-10-CM

## 2023-08-09 DIAGNOSIS — Z96.649 STATUS POST HIP REPLACEMENT, UNSPECIFIED LATERALITY: ICD-10-CM

## 2023-08-09 DIAGNOSIS — E78.00 HYPERCHOLESTEROLEMIA: ICD-10-CM

## 2023-08-09 DIAGNOSIS — I10 PRIMARY HYPERTENSION: ICD-10-CM

## 2023-08-09 DIAGNOSIS — Z11.59 NEED FOR HEPATITIS C SCREENING TEST: ICD-10-CM

## 2023-08-09 LAB
ANION GAP SERPL CALCULATED.3IONS-SCNC: 13 MMOL/L (ref 7–15)
BASOPHILS # BLD AUTO: 0 10E3/UL (ref 0–0.2)
BASOPHILS NFR BLD AUTO: 1 %
BUN SERPL-MCNC: 17.9 MG/DL (ref 8–23)
CALCIUM SERPL-MCNC: 9.8 MG/DL (ref 8.8–10.2)
CHLORIDE SERPL-SCNC: 105 MMOL/L (ref 98–107)
CHOLEST SERPL-MCNC: 162 MG/DL
CREAT SERPL-MCNC: 1.02 MG/DL (ref 0.51–0.95)
DEPRECATED HCO3 PLAS-SCNC: 26 MMOL/L (ref 22–29)
EOSINOPHIL # BLD AUTO: 0.2 10E3/UL (ref 0–0.7)
EOSINOPHIL NFR BLD AUTO: 3 %
ERYTHROCYTE [DISTWIDTH] IN BLOOD BY AUTOMATED COUNT: 12.5 % (ref 10–15)
GFR SERPL CREATININE-BSD FRML MDRD: 58 ML/MIN/1.73M2
GLUCOSE SERPL-MCNC: 116 MG/DL (ref 70–99)
HCT VFR BLD AUTO: 46.7 % (ref 35–47)
HDLC SERPL-MCNC: 49 MG/DL
HGB BLD-MCNC: 15.4 G/DL (ref 11.7–15.7)
IMM GRANULOCYTES # BLD: 0 10E3/UL
IMM GRANULOCYTES NFR BLD: 0 %
LDLC SERPL CALC-MCNC: 80 MG/DL
LYMPHOCYTES # BLD AUTO: 1.7 10E3/UL (ref 0.8–5.3)
LYMPHOCYTES NFR BLD AUTO: 26 %
MCH RBC QN AUTO: 30.3 PG (ref 26.5–33)
MCHC RBC AUTO-ENTMCNC: 33 G/DL (ref 31.5–36.5)
MCV RBC AUTO: 92 FL (ref 78–100)
MONOCYTES # BLD AUTO: 0.5 10E3/UL (ref 0–1.3)
MONOCYTES NFR BLD AUTO: 8 %
NEUTROPHILS # BLD AUTO: 4.1 10E3/UL (ref 1.6–8.3)
NEUTROPHILS NFR BLD AUTO: 63 %
NONHDLC SERPL-MCNC: 113 MG/DL
PLATELET # BLD AUTO: 197 10E3/UL (ref 150–450)
POTASSIUM SERPL-SCNC: 4.6 MMOL/L (ref 3.4–5.3)
RBC # BLD AUTO: 5.08 10E6/UL (ref 3.8–5.2)
SODIUM SERPL-SCNC: 144 MMOL/L (ref 136–145)
TRIGL SERPL-MCNC: 163 MG/DL
WBC # BLD AUTO: 6.5 10E3/UL (ref 4–11)

## 2023-08-09 PROCEDURE — 80048 BASIC METABOLIC PNL TOTAL CA: CPT | Performed by: FAMILY MEDICINE

## 2023-08-09 PROCEDURE — 80061 LIPID PANEL: CPT | Performed by: FAMILY MEDICINE

## 2023-08-09 PROCEDURE — 85025 COMPLETE CBC W/AUTO DIFF WBC: CPT | Mod: QW | Performed by: FAMILY MEDICINE

## 2023-08-09 PROCEDURE — 36415 COLL VENOUS BLD VENIPUNCTURE: CPT | Performed by: FAMILY MEDICINE

## 2023-08-09 PROCEDURE — G0439 PPPS, SUBSEQ VISIT: HCPCS | Performed by: FAMILY MEDICINE

## 2023-08-09 PROCEDURE — 99214 OFFICE O/P EST MOD 30 MIN: CPT | Mod: 25 | Performed by: FAMILY MEDICINE

## 2023-08-09 RX ORDER — CLINDAMYCIN HCL 300 MG
CAPSULE ORAL
Qty: 4 CAPSULE | Refills: 1 | Status: SHIPPED | OUTPATIENT
Start: 2023-08-09

## 2023-08-09 RX ORDER — AMLODIPINE BESYLATE 5 MG/1
5 TABLET ORAL DAILY
Qty: 90 TABLET | Refills: 2 | Status: SHIPPED | OUTPATIENT
Start: 2023-08-09

## 2023-08-09 RX ORDER — ATORVASTATIN CALCIUM 20 MG/1
20 TABLET, FILM COATED ORAL DAILY
Qty: 90 TABLET | Refills: 2 | Status: SHIPPED | OUTPATIENT
Start: 2023-08-09

## 2023-08-09 RX ORDER — METOPROLOL SUCCINATE 50 MG/1
50 TABLET, EXTENDED RELEASE ORAL DAILY
Qty: 90 TABLET | Refills: 2 | Status: SHIPPED | OUTPATIENT
Start: 2023-08-09

## 2023-08-09 RX ORDER — OXYBUTYNIN CHLORIDE 5 MG/1
5 TABLET, EXTENDED RELEASE ORAL DAILY
Qty: 90 TABLET | Refills: 2 | Status: SHIPPED | OUTPATIENT
Start: 2023-08-09

## 2023-08-09 RX ORDER — LISINOPRIL 20 MG/1
20 TABLET ORAL DAILY
Qty: 90 TABLET | Refills: 2 | Status: SHIPPED | OUTPATIENT
Start: 2023-08-09

## 2023-08-09 ASSESSMENT — ENCOUNTER SYMPTOMS
SORE THROAT: 0
EYE PAIN: 0
DIARRHEA: 0
NERVOUS/ANXIOUS: 0
PALPITATIONS: 0
DIZZINESS: 0
FREQUENCY: 1
JOINT SWELLING: 0
HEADACHES: 0
WEAKNESS: 0
HEARTBURN: 0
NAUSEA: 0
COUGH: 0
CHILLS: 0
HEMATOCHEZIA: 0
CONSTIPATION: 0
MYALGIAS: 0
PARESTHESIAS: 0
SHORTNESS OF BREATH: 0
FEVER: 0
HEMATURIA: 0
BREAST MASS: 0
DYSURIA: 0
ABDOMINAL PAIN: 0
ARTHRALGIAS: 0

## 2023-08-09 ASSESSMENT — ACTIVITIES OF DAILY LIVING (ADL): CURRENT_FUNCTION: NO ASSISTANCE NEEDED

## 2023-08-09 NOTE — PROGRESS NOTES
"SUBJECTIVE:   Walter is a 73 year old who presents for Preventive Visit.  Overall doing well.  Lives independently with her  Yao.  Her blood pressures at home 120s 130s over 60s and 70s.      8/9/2023     9:14 AM   Additional Questions   Roomed by Cori       Are you in the first 12 months of your Medicare coverage?  No    Healthy Habits:     In general, how would you rate your overall health?  Good    Frequency of exercise:  None    Do you usually eat at least 4 servings of fruit and vegetables a day, include whole grains    & fiber and avoid regularly eating high fat or \"junk\" foods?  Yes    Taking medications regularly:  Yes    Medication side effects:  None    Ability to successfully perform activities of daily living:  No assistance needed    Home Safety:  Throw rugs in the hallway    Hearing Impairment:  No hearing concerns    In the past 6 months, have you been bothered by leaking of urine? Yes    In general, how would you rate your overall mental or emotional health?  Good    Additional concerns today:  No        Have you ever done Advance Care Planning? (For example, a Health Directive, POLST, or a discussion with a medical provider or your loved ones about your wishes): Yes, advance care planning is on file.     Declines hearing screen.    Fall risk  Fallen 2 or more times in the past year?: No  Any fall with injury in the past year?: No    Cognitive Screening   1) Repeat 3 items (Leader, Season, Table)    2) Clock draw: NORMAL  3) 3 item recall: Recalls 3 objects  Results: 3 items recalled: COGNITIVE IMPAIRMENT LESS LIKELY    Mini-CogTM Copyright DOC Trimble. Licensed by the author for use in [facility]; reprinted with permission (juan@.[facility]). All rights reserved.          Reviewed and updated as needed this visit by clinical staff   Tobacco  Allergies  Meds              Reviewed and updated as needed this visit by Provider                 Social History     Tobacco Use     " Smoking status: Never     Smokeless tobacco: Never   Substance Use Topics     Alcohol use: Yes     Comment: rarely             8/9/2023     9:12 AM   Alcohol Use   Prescreen: >3 drinks/day or >7 drinks/week? No     Do you have a current opioid prescription? No  Do you use any other controlled substances or medications that are not prescribed by a provider? None          Current providers sharing in care for this patient include:   Patient Care Team:  Jorge Tim MD as PCP - General (Family Medicine)  Jorge Tim MD as Assigned PCP    The following health maintenance items are reviewed in Epic and correct as of today:  Health Maintenance   Topic Date Due     HEPATITIS C SCREENING  Never done     ZOSTER IMMUNIZATION (1 of 2) Never done     BMP  05/04/2023     ANNUAL REVIEW OF HM ORDERS  05/04/2023     COVID-19 Vaccine (5 - Moderna series) 06/16/2023     LIPID  08/03/2023     MEDICARE ANNUAL WELLNESS VISIT  08/03/2023     MAMMO SCREENING  08/12/2023     INFLUENZA VACCINE (1) 09/01/2023     FALL RISK ASSESSMENT  08/09/2024     DTAP/TDAP/TD IMMUNIZATION (2 - Td or Tdap) 12/03/2024     COLORECTAL CANCER SCREENING  08/14/2025     ADVANCE CARE PLANNING  08/10/2027     DEXA  08/12/2036     PHQ-2 (once per calendar year)  Completed     Pneumococcal Vaccine: 65+ Years  Completed     IPV IMMUNIZATION  Aged Out     MENINGITIS IMMUNIZATION  Aged Out       Patient Active Problem List   Diagnosis     Gout     Hypercholesterolemia     Nocturia     Obesity     Osteoarthritis of knee     Primary hypertension     Seasonal allergic rhinitis     Osteoarthritis of hip     Morbid obesity (H)     Status post hip replacement, unspecified laterality     Urinary incontinence in female     Current Outpatient Medications   Medication     acetaminophen (TYLENOL) 500 MG tablet     amLODIPine (NORVASC) 5 MG tablet     atorvastatin (LIPITOR) 20 MG tablet     calcium carbonate (OS-LOLITA) 1500 (600 Ca) MG tablet     Cholecalciferol  "(VITAMIN D3 PO)     clindamycin (CLEOCIN) 300 MG capsule     Garlic Oil 500 MG TABS     lisinopril (ZESTRIL) 20 MG tablet     metoprolol succinate ER (TOPROL XL) 50 MG 24 hr tablet     Omega-3 Fatty Acids (FISH OIL PO)     oxyBUTYnin ER (DITROPAN XL) 5 MG 24 hr tablet     indomethacin (INDOCIN) 25 MG capsule     No current facility-administered medications for this visit.             Review of Systems   Constitutional:  Negative for chills and fever.   HENT:  Negative for congestion, ear pain, hearing loss and sore throat.    Eyes:  Positive for visual disturbance. Negative for pain.   Respiratory:  Negative for cough and shortness of breath.    Cardiovascular:  Negative for chest pain, palpitations and peripheral edema.   Gastrointestinal:  Negative for abdominal pain, constipation, diarrhea, heartburn, hematochezia and nausea.   Breasts:  Negative for tenderness, breast mass and discharge.   Genitourinary:  Positive for frequency and urgency. Negative for dysuria, genital sores, hematuria, pelvic pain and vaginal discharge.   Musculoskeletal:  Negative for arthralgias, joint swelling and myalgias.   Skin:  Negative for rash.   Neurological:  Negative for dizziness, weakness, headaches and paresthesias.   Psychiatric/Behavioral:  Negative for mood changes. The patient is not nervous/anxious.      Constitutional, HEENT, cardiovascular, pulmonary, gi and gu systems are negative, except as otherwise noted.    OBJECTIVE:   BP (!) 146/86 (BP Location: Right arm, Patient Position: Sitting, Cuff Size: Adult Large)   Pulse 68   Temp 98  F (36.7  C) (Temporal)   Resp 20   Ht 1.626 m (5' 4\")   Wt 103 kg (227 lb)   LMP  (LMP Unknown)   SpO2 98%   BMI 38.96 kg/m   Estimated body mass index is 38.96 kg/m  as calculated from the following:    Height as of this encounter: 1.626 m (5' 4\").    Weight as of this encounter: 103 kg (227 lb).  Physical Exam  GENERAL: healthy, alert and no distress  NECK: no adenopathy, no " "asymmetry, masses, or scars and thyroid normal to palpation  RESP: lungs clear to auscultation - no rales, rhonchi or wheezes  CV: regular rate and rhythm, normal S1 S2, no S3 or S4, no murmur, click or rub, no peripheral edema and peripheral pulses strong  ABDOMEN: soft, nontender, no hepatosplenomegaly, no masses and bowel sounds normal  MS: no gross musculoskeletal defects noted, no edema        ASSESSMENT / PLAN:   (M10.9) Gout, unspecified cause, unspecified chronicity, unspecified site  (primary encounter diagnosis)  Comment: No flareups in the last year  Plan:     (I10) Primary hypertension  Comment: Elevated today but normal at home she will bring her cuff in next time  Plan: amLODIPine (NORVASC) 5 MG tablet, lisinopril         (ZESTRIL) 20 MG tablet, metoprolol succinate ER        (TOPROL XL) 50 MG 24 hr tablet, CBC with         Platelets & Differential, Basic metabolic panel            (E78.00) Hypercholesterolemia  Comment: Well-controlled with atorvastatin  Plan: atorvastatin (LIPITOR) 20 MG tablet, Lipid         panel reflex to direct LDL Fasting            (Z96.649) Status post hip replacement, unspecified laterality  Comment: Pain-free very pleased  Plan: clindamycin (CLEOCIN) 300 MG capsule            (R32) Urinary incontinence in female  Comment: Discussed pelvic floor exercises with her today.  Plan: oxyBUTYnin ER (DITROPAN XL) 5 MG 24 hr tablet            (Z11.59) Need for hepatitis C screening test  Comment:   Plan:     (Z00.00) Encounter for Medicare annual wellness exam  Comment: Healthcare maintenance and advance directives reviewed  Plan:               COUNSELING:  Reviewed preventive health counseling, as reflected in patient instructions      BMI:   Estimated body mass index is 38.96 kg/m  as calculated from the following:    Height as of this encounter: 1.626 m (5' 4\").    Weight as of this encounter: 103 kg (227 lb).         She reports that she has never smoked. She has never used " smokeless tobacco.      Appropriate preventive services were discussed with this patient, including applicable screening as appropriate for cardiovascular disease, diabetes, osteopenia/osteoporosis, and glaucoma.  As appropriate for age/gender, discussed screening for colorectal cancer, prostate cancer, breast cancer, and cervical cancer. Checklist reviewing preventive services available has been given to the patient.    Reviewed patients plan of care and provided an AVS. The Basic Care Plan (routine screening as documented in Health Maintenance) for Thora meets the Care Plan requirement. This Care Plan has been established and reviewed with the Patient.          Jorge Tim MD  [facility]    Identified Health Risks:

## 2023-08-09 NOTE — PATIENT INSTRUCTIONS
"Patient Education   Personalized Prevention Plan  You are due for the preventive services outlined below.  Your care team is available to assist you in scheduling these services.  If you have already completed any of these items, please share that information with your care team to update in your medical record.  Health Maintenance Due   Topic Date Due     Hepatitis C Screening  Never done     Zoster (Shingles) Vaccine (1 of 2) Never done     Basic Metabolic Panel  05/04/2023     COVID-19 Vaccine (5 - Moderna series) 06/16/2023     Cholesterol Lab  08/03/2023     Annual Wellness Visit  08/03/2023     Mammogram  08/12/2023     Learning About Being Physically Active  What is physical activity?     Being physically active means doing any kind of activity that gets your body moving.  The types of physical activity that can help you get fit and stay healthy include:  Aerobic or \"cardio\" activities. These make your heart beat faster and make you breathe harder, such as brisk walking, riding a bike, or running. They strengthen your heart and lungs and build up your endurance.  Strength training activities. These make your muscles work against, or \"resist,\" something. Examples include lifting weights or doing push-ups. These activities help tone and strengthen your muscles and bones.  Stretches. These let you move your joints and muscles through their full range of motion. Stretching helps you be more flexible.  Reaching a balance between these three types of physical activity is important because each one contributes to your overall fitness.  What are the benefits of being active?  Being active is one of the best things you can do for your health. It helps you to:  Feel stronger and have more energy to do all the things you like to do.  Focus better at school or work.  Feel, think, and sleep better.  Reach and stay at a healthy weight.  Lose fat and build lean muscle.  Lower your risk for serious health problems, including " "diabetes, heart attack, high blood pressure, and some cancers.  Keep your heart, lungs, bones, muscles, and joints strong and healthy.  How can you make being active part of your life?  Start slowly. Make it your long-term goal to get at least 30 minutes of exercise on most days of the week. Walking is a good choice. You also may want to do other activities, such as running, swimming, cycling, or playing tennis or team sports.  Pick activities that you like--ones that make your heart beat faster, your muscles stronger, and your muscles and joints more flexible. If you find more than one thing you like doing, do them all. You don't have to do the same thing every day.  Get your heart pumping every day. Any activity that makes your heart beat faster and keeps it at that rate for a while counts.  Here are some great ways to get your heart beating faster:  Go for a brisk walk, run, or bike ride.  Go for a hike or swim.  Go in-line skating.  Play a game of touch football, basketball, or soccer.  Ride a bike.  Play tennis or racquetball.  Climb stairs.  Even some household chores can be aerobic--just do them at a faster pace. Vacuuming, raking or mowing the lawn, sweeping the garage, and washing and waxing the car all can help get your heart rate up.  Strengthen your muscles during the week. You don't have to lift heavy weights or grow big, bulky muscles to get stronger. Doing a few simple activities that make your muscles work against, or \"resist,\" something can help you get stronger.  For example, you can:  Do push-ups or sit-ups, which use your own body weight as resistance.  Lift weights or dumbbells or use stretch bands at home or in a gym or community center.  Stretch your muscles often. Stretching will help you as you become more active. It can help you stay flexible, loosen tight muscles, and avoid injury. It can also help improve your balance and posture and can be a great way to relax.  Be sure to stretch the " "muscles you'll be using when you work out. It's best to warm your muscles slightly before you stretch them. Walk or do some other light aerobic activity for a few minutes, and then start stretching.  When you stretch your muscles:  Do it slowly. Stretching is not about going fast or making sudden movements.  Don't push or bounce during a stretch.  Hold each stretch for at least 15 to 30 seconds, if you can. You should feel a stretch in the muscle, but not pain.  Breathe out as you do the stretch. Then breathe in as you hold the stretch. Don't hold your breath.  If you're worried about how more activity might affect your health, have a checkup before you start. Follow any special advice your doctor gives you for getting a smart start.  Where can you learn more?  Go to https://www.[employer].net/patiented  Enter W332 in the search box to learn more about \"Learning About Being Physically Active.\"  Current as of: October 10, 2022               Content Version: 13.7    7908-1729 [employer].   Care instructions adapted under license by your healthcare professional. If you have questions about a medical condition or this instruction, always ask your healthcare professional. [employer] disclaims any warranty or liability for your use of this information.      Bladder Training: Care Instructions  Your Care Instructions     Bladder training is used to treat urge incontinence and stress incontinence. Urge incontinence means that the need to urinate comes on so fast that you can't get to a toilet in time. Stress incontinence means that you leak urine because of pressure on your bladder. For example, it may happen when you laugh, cough, or lift something heavy.  Bladder training can increase how long you can wait before you have to urinate. It can also help your bladder hold more urine. And it can give you better control over the urge to urinate.  It is important to remember that bladder training " takes a few weeks to a few months to make a difference. You may not see results right away, but don't give up.  Follow-up care is a key part of your treatment and safety. Be sure to make and go to all appointments, and call your doctor if you are having problems. It's also a good idea to know your test results and keep a list of the medicines you take.  How can you care for yourself at home?  Work with your doctor to come up with a bladder training program that is right for you. You may use one or more of the following methods.  Delayed urination  In the beginning, try to keep from urinating for 5 minutes after you first feel the need to go.  While you wait, take deep, slow breaths to relax. Kegel exercises can also help you delay the need to go to the bathroom.  After some practice, when you can easily wait 5 minutes to urinate, try to wait 10 minutes before you urinate.  Slowly increase the waiting period until you are able to control when you have to urinate.  Scheduled urination  Empty your bladder when you first wake up in the morning.  Schedule times throughout the day when you will urinate.  Start by going to the bathroom every hour, even if you don't need to go.  Slowly increase the time between trips to the bathroom.  When you have found a schedule that works well for you, keep doing it.  If you wake up during the night and have to urinate, do it. Apply your schedule to waking hours only.  Kegel exercises  These tighten and strengthen pelvic muscles, which can help you control the flow of urine. (If doing these exercises causes pain, stop doing them and talk with your doctor.) To do Kegel exercises:  Squeeze your muscles as if you were trying not to pass gas. Or squeeze your muscles as if you were stopping the flow of urine. Your belly, legs, and buttocks shouldn't move.  Hold the squeeze for 3 seconds, then relax for 5 to 10 seconds.  Start with 3 seconds, then add 1 second each week until you are able to  "squeeze for 10 seconds.  Repeat the exercise 10 times a session. Do 3 to 8 sessions a day.  When should you call for help?  Watch closely for changes in your health, and be sure to contact your doctor if:    Your incontinence is getting worse.     You do not get better as expected.   Where can you learn more?  Go to https://www.[employer].net/patiented  Enter V684 in the search box to learn more about \"Bladder Training: Care Instructions.\"  Current as of: March 1, 2023               Content Version: 13.7    8287-8832 [employer].   Care instructions adapted under license by your healthcare professional. If you have questions about a medical condition or this instruction, always ask your healthcare professional. [employer] disclaims any warranty or liability for your use of this information.         "

## 2023-08-09 NOTE — PROGRESS NOTES
"She is at risk for lack of exercise and has been provided with information to increase physical activity for the benefit of her well-being.  Information on urinary incontinence and treatment options given to patient.Answers submitted by the patient for this visit:  Annual Preventive Visit (Submitted on 8/9/2023)  Chief Complaint: Annual Exam:  In general, how would you rate your overall physical health?: good  Frequency of exercise:: None  Do you usually eat at least 4 servings of fruit and vegetables a day, include whole grains & fiber, and avoid regularly eating high fat or \"junk\" foods? : Yes  Taking medications regularly:: Yes  Medication side effects:: None  Activities of Daily Living: no assistance needed  Home safety: throw rugs in the hallway  Hearing Impairment:: no hearing concerns  In the past 6 months, have you been bothered by leaking of urine?: Yes  abdominal pain: No  Blood in stool: No  Blood in urine: No  chest pain: No  chills: No  congestion: No  constipation: No  cough: No  diarrhea: No  dizziness: No  ear pain: No  eye pain: No  nervous/anxious: No  fever: No  frequency: Yes  genital sores: No  headaches: No  hearing loss: No  heartburn: No  arthralgias: No  joint swelling: No  peripheral edema: No  mood changes: No  myalgias: No  nausea: No  dysuria: No  palpitations: No  Skin sensation changes: No  sore throat: No  urgency: Yes  rash: No  shortness of breath: No  visual disturbance: Yes  weakness: No  pelvic pain: No  vaginal discharge: No  tenderness: No  breast mass: No  breast discharge: No  In general, how would you rate your overall mental or emotional health?: good  Additional concerns today:: No    "

## 2023-08-09 NOTE — LETTER
August 9, 2023      Walter Au  [address] [facility] [id number]        Dear ,    We are writing to inform you of your test results.    Your test results fall within the expected range(s) or remain unchanged from previous results.  Please continue with current treatment plan.    Resulted Orders   Basic metabolic panel   Result Value Ref Range    Sodium 144 136 - 145 mmol/L    Potassium 4.6 3.4 - 5.3 mmol/L    Chloride 105 98 - 107 mmol/L    Carbon Dioxide (CO2) 26 22 - 29 mmol/L    Anion Gap 13 7 - 15 mmol/L    Urea Nitrogen 17.9 8.0 - 23.0 mg/dL    Creatinine 1.02 (H) 0.51 - 0.95 mg/dL    Calcium 9.8 8.8 - 10.2 mg/dL    Glucose 116 (H) 70 - 99 mg/dL    GFR Estimate 58 (L) >60 mL/min/1.73m2   Lipid panel reflex to direct LDL Fasting   Result Value Ref Range    Cholesterol 162 <200 mg/dL    Triglycerides 163 (H) <150 mg/dL    Direct Measure HDL 49 (L) >=50 mg/dL    LDL Cholesterol Calculated 80 <=100 mg/dL    Non HDL Cholesterol 113 <130 mg/dL    Narrative    Cholesterol  Desirable:  <200 mg/dL    Triglycerides  Normal:  Less than 150 mg/dL  Borderline High:  150-199 mg/dL  High:  200-499 mg/dL  Very High:  Greater than or equal to 500 mg/dL    Direct Measure HDL  Female:  Greater than or equal to 50 mg/dL   Male:  Greater than or equal to 40 mg/dL    LDL Cholesterol  Desirable:  <100mg/dL  Above Desirable:  100-129 mg/dL   Borderline High:  130-159 mg/dL   High:  160-189 mg/dL   Very High:  >= 190 mg/dL    Non HDL Cholesterol  Desirable:  130 mg/dL  Above Desirable:  130-159 mg/dL  Borderline High:  160-189 mg/dL  High:  190-219 mg/dL  Very High:  Greater than or equal to 220 mg/dL   CBC with platelets and differential   Result Value Ref Range    WBC Count 6.5 4.0 - 11.0 10e3/uL    RBC Count 5.08 3.80 - 5.20 10e6/uL    Hemoglobin 15.4 11.7 - 15.7 g/dL    Hematocrit 46.7 35.0 - 47.0 %    MCV 92 78 - 100 fL    MCH 30.3 26.5 - 33.0 pg    MCHC 33.0 31.5 - 36.5 g/dL    RDW 12.5 10.0 - 15.0 %     Platelet Count 197 150 - 450 10e3/uL    % Neutrophils 63 %    % Lymphocytes 26 %    % Monocytes 8 %    % Eosinophils 3 %    % Basophils 1 %    % Immature Granulocytes 0 %    Absolute Neutrophils 4.1 1.6 - 8.3 10e3/uL    Absolute Lymphocytes 1.7 0.8 - 5.3 10e3/uL    Absolute Monocytes 0.5 0.0 - 1.3 10e3/uL    Absolute Eosinophils 0.2 0.0 - 0.7 10e3/uL    Absolute Basophils 0.0 0.0 - 0.2 10e3/uL    Absolute Immature Granulocytes 0.0 <=0.4 10e3/uL       If you have any questions or concerns, please call the clinic at the number listed above.       Sincerely,      Jorge Tim MD

## 2023-10-03 ENCOUNTER — ALLIED HEALTH/NURSE VISIT (OUTPATIENT)
Dept: FAMILY MEDICINE | Facility: CLINIC | Age: 73
End: 2023-10-03
Payer: COMMERCIAL

## 2023-10-03 DIAGNOSIS — Z23 NEED FOR VACCINATION: Primary | ICD-10-CM

## 2023-10-03 PROCEDURE — 99207 PR NO CHARGE NURSE ONLY: CPT

## 2023-10-03 PROCEDURE — G0008 ADMIN INFLUENZA VIRUS VAC: HCPCS

## 2023-10-03 PROCEDURE — 90662 IIV NO PRSV INCREASED AG IM: CPT

## 2023-10-31 ENCOUNTER — ALLIED HEALTH/NURSE VISIT (OUTPATIENT)
Dept: FAMILY MEDICINE | Facility: CLINIC | Age: 73
End: 2023-10-31
Payer: COMMERCIAL

## 2023-10-31 DIAGNOSIS — Z23 NEED FOR VACCINATION: Primary | ICD-10-CM

## 2023-10-31 PROCEDURE — 91320 SARSCV2 VAC 30MCG TRS-SUC IM: CPT

## 2023-10-31 PROCEDURE — 90480 ADMN SARSCOV2 VAC 1/ONLY CMP: CPT

## 2023-10-31 PROCEDURE — 99207 PR NO CHARGE NURSE ONLY: CPT
